# Patient Record
Sex: MALE | Race: WHITE | Employment: OTHER | URBAN - METROPOLITAN AREA
[De-identification: names, ages, dates, MRNs, and addresses within clinical notes are randomized per-mention and may not be internally consistent; named-entity substitution may affect disease eponyms.]

---

## 2017-01-01 ENCOUNTER — HOSPITAL ENCOUNTER (OUTPATIENT)
Dept: CARDIAC REHAB | Age: 67
Discharge: HOME | End: 2017-11-13
Payer: MEDICARE

## 2017-01-01 ENCOUNTER — APPOINTMENT (OUTPATIENT)
Dept: CARDIAC REHAB | Age: 67
End: 2017-01-01

## 2017-01-01 ENCOUNTER — HOSPITAL ENCOUNTER (OUTPATIENT)
Dept: CARDIAC CATH/INVASIVE PROCEDURES | Age: 67
Setting detail: OBSERVATION
Discharge: HOME | End: 2017-10-19
Attending: INTERNAL MEDICINE | Admitting: INTERNAL MEDICINE
Payer: MEDICARE

## 2017-01-01 ENCOUNTER — HOSPITAL ENCOUNTER (OUTPATIENT)
Dept: LAB | Age: 67
Discharge: HOME | End: 2017-10-13
Attending: INTERNAL MEDICINE
Payer: MEDICARE

## 2017-01-01 ENCOUNTER — HOSPITAL ENCOUNTER (OUTPATIENT)
Dept: CARDIAC REHAB | Age: 67
Discharge: HOME | End: 2017-10-31
Payer: MEDICARE

## 2017-01-01 ENCOUNTER — APPOINTMENT (OUTPATIENT)
Dept: CARDIAC REHAB | Age: 67
End: 2017-01-01
Payer: MEDICARE

## 2017-01-01 ENCOUNTER — HOSPITAL ENCOUNTER (OUTPATIENT)
Dept: CARDIAC REHAB | Age: 67
End: 2017-01-01
Payer: MEDICARE

## 2017-01-01 ENCOUNTER — HOSPITAL ENCOUNTER (OUTPATIENT)
Dept: CARDIAC REHAB | Age: 67
Discharge: HOME | End: 2017-10-26

## 2017-01-01 VITALS
OXYGEN SATURATION: 93 % | BODY MASS INDEX: 37.52 KG/M2 | HEIGHT: 75 IN | TEMPERATURE: 97.6 F | WEIGHT: 301.8 LBS | HEART RATE: 73 BPM | SYSTOLIC BLOOD PRESSURE: 114 MMHG | DIASTOLIC BLOOD PRESSURE: 65 MMHG | RESPIRATION RATE: 18 BRPM

## 2017-01-01 VITALS — BODY MASS INDEX: 39.17 KG/M2 | WEIGHT: 315 LBS | HEIGHT: 75 IN

## 2017-01-01 DIAGNOSIS — I25.118 CORONARY ARTERY DISEASE OF NATIVE ARTERY OF NATIVE HEART WITH STABLE ANGINA PECTORIS (HCC): Chronic | ICD-10-CM

## 2017-01-01 LAB
ANION GAP SERPL CALC-SCNC: 7 MMOL/L
ANION GAP SERPL CALC-SCNC: 9 MMOL/L (ref 7–16)
ATRIAL RATE: 73 BPM
ATRIAL RATE: 75 BPM
BASOPHILS # BLD: 0 K/UL (ref 0–0.2)
BASOPHILS # BLD: 0 K/UL (ref 0–0.2)
BASOPHILS NFR BLD: 0 % (ref 0–2)
BASOPHILS NFR BLD: 0 % (ref 0–2)
BNP SERPL-MCNC: 52 PG/ML
BUN SERPL-MCNC: 19 MG/DL (ref 8–23)
BUN SERPL-MCNC: 20 MG/DL (ref 8–23)
CALCIUM SERPL-MCNC: 8.8 MG/DL (ref 8.3–10.4)
CALCIUM SERPL-MCNC: 8.9 MG/DL (ref 8.3–10.4)
CALCULATED P AXIS, ECG09: 28 DEGREES
CALCULATED P AXIS, ECG09: 61 DEGREES
CALCULATED R AXIS, ECG10: 49 DEGREES
CALCULATED R AXIS, ECG10: 70 DEGREES
CALCULATED T AXIS, ECG11: 26 DEGREES
CALCULATED T AXIS, ECG11: 38 DEGREES
CHLORIDE SERPL-SCNC: 101 MMOL/L (ref 98–107)
CHLORIDE SERPL-SCNC: 106 MMOL/L (ref 98–107)
CO2 SERPL-SCNC: 26 MMOL/L (ref 21–32)
CO2 SERPL-SCNC: 31 MMOL/L (ref 21–32)
CREAT SERPL-MCNC: 1.03 MG/DL (ref 0.8–1.5)
CREAT SERPL-MCNC: 1.2 MG/DL (ref 0.8–1.5)
DIAGNOSIS, 93000: NORMAL
DIAGNOSIS, 93000: NORMAL
DIFFERENTIAL METHOD BLD: ABNORMAL
DIFFERENTIAL METHOD BLD: NORMAL
EOSINOPHIL # BLD: 0.1 K/UL (ref 0–0.8)
EOSINOPHIL # BLD: 0.1 K/UL (ref 0–0.8)
EOSINOPHIL NFR BLD: 2 % (ref 0.5–7.8)
EOSINOPHIL NFR BLD: 2 % (ref 0.5–7.8)
ERYTHROCYTE [DISTWIDTH] IN BLOOD BY AUTOMATED COUNT: 13 % (ref 11.9–14.6)
ERYTHROCYTE [DISTWIDTH] IN BLOOD BY AUTOMATED COUNT: 13.3 % (ref 11.9–14.6)
GLUCOSE BLD STRIP.AUTO-MCNC: 194 MG/DL (ref 65–100)
GLUCOSE BLD STRIP.AUTO-MCNC: 207 MG/DL (ref 65–100)
GLUCOSE BLD STRIP.AUTO-MCNC: 235 MG/DL (ref 65–100)
GLUCOSE BLD STRIP.AUTO-MCNC: 298 MG/DL (ref 65–100)
GLUCOSE BLD STRIP.AUTO-MCNC: 347 MG/DL (ref 65–100)
GLUCOSE SERPL-MCNC: 193 MG/DL (ref 65–100)
GLUCOSE SERPL-MCNC: 273 MG/DL (ref 65–100)
HCT VFR BLD AUTO: 40.1 % (ref 41.1–50.3)
HCT VFR BLD AUTO: 41.5 % (ref 41.1–50.3)
HGB BLD-MCNC: 13.4 G/DL (ref 13.6–17.2)
HGB BLD-MCNC: 13.6 G/DL (ref 13.6–17.2)
IMM GRANULOCYTES # BLD: 0 K/UL (ref 0–0.5)
IMM GRANULOCYTES NFR BLD: 0.3 % (ref 0–5)
LYMPHOCYTES # BLD: 1.6 K/UL (ref 0.5–4.6)
LYMPHOCYTES # BLD: 1.7 K/UL (ref 0.5–4.6)
LYMPHOCYTES NFR BLD: 22 % (ref 13–44)
LYMPHOCYTES NFR BLD: 24 % (ref 13–44)
MCH RBC QN AUTO: 31.3 PG (ref 26.1–32.9)
MCH RBC QN AUTO: 31.9 PG (ref 26.1–32.9)
MCHC RBC AUTO-ENTMCNC: 32.8 G/DL (ref 31.4–35)
MCHC RBC AUTO-ENTMCNC: 33.4 G/DL (ref 31.4–35)
MCV RBC AUTO: 93.7 FL (ref 79.6–97.8)
MCV RBC AUTO: 97.2 FL (ref 79.6–97.8)
MONOCYTES # BLD: 0.4 K/UL (ref 0.1–1.3)
MONOCYTES # BLD: 0.4 K/UL (ref 0.1–1.3)
MONOCYTES NFR BLD: 5 % (ref 4–12)
MONOCYTES NFR BLD: 6 % (ref 4–12)
NEUTS SEG # BLD: 4.4 K/UL (ref 1.7–8.2)
NEUTS SEG # BLD: 5.5 K/UL (ref 1.7–8.2)
NEUTS SEG NFR BLD: 68 % (ref 43–78)
NEUTS SEG NFR BLD: 71 % (ref 43–78)
P-R INTERVAL, ECG05: 280 MS
P-R INTERVAL, ECG05: 302 MS
PLATELET # BLD AUTO: 240 K/UL (ref 150–450)
PLATELET # BLD AUTO: 252 K/UL (ref 150–450)
PMV BLD AUTO: 10.8 FL (ref 10.8–14.1)
PMV BLD AUTO: 10.9 FL (ref 10.8–14.1)
POTASSIUM SERPL-SCNC: 3.6 MMOL/L (ref 3.5–5.1)
POTASSIUM SERPL-SCNC: 3.9 MMOL/L (ref 3.5–5.1)
Q-T INTERVAL, ECG07: 412 MS
Q-T INTERVAL, ECG07: 418 MS
QRS DURATION, ECG06: 88 MS
QRS DURATION, ECG06: 94 MS
QTC CALCULATION (BEZET), ECG08: 453 MS
QTC CALCULATION (BEZET), ECG08: 466 MS
RBC # BLD AUTO: 4.27 M/UL (ref 4.23–5.67)
RBC # BLD AUTO: 4.28 M/UL (ref 4.23–5.67)
SODIUM SERPL-SCNC: 139 MMOL/L (ref 136–145)
SODIUM SERPL-SCNC: 141 MMOL/L (ref 136–145)
VENTRICULAR RATE, ECG03: 73 BPM
VENTRICULAR RATE, ECG03: 75 BPM
WBC # BLD AUTO: 6.6 K/UL (ref 4.3–11.1)
WBC # BLD AUTO: 7.7 K/UL (ref 4.3–11.1)

## 2017-01-01 PROCEDURE — 99218 HC RM OBSERVATION: CPT

## 2017-01-01 PROCEDURE — 80048 BASIC METABOLIC PNL TOTAL CA: CPT | Performed by: INTERNAL MEDICINE

## 2017-01-01 PROCEDURE — C1725 CATH, TRANSLUMIN NON-LASER: HCPCS

## 2017-01-01 PROCEDURE — 93798 PHYS/QHP OP CAR RHAB W/ECG: CPT

## 2017-01-01 PROCEDURE — 77030004534 HC CATH ANGI DX INFN CARD -A

## 2017-01-01 PROCEDURE — 77030015766

## 2017-01-01 PROCEDURE — C8929 TTE W OR WO FOL WCON,DOPPLER: HCPCS

## 2017-01-01 PROCEDURE — 74011250636 HC RX REV CODE- 250/636: Performed by: INTERNAL MEDICINE

## 2017-01-01 PROCEDURE — 92928 PRQ TCAT PLMT NTRAC ST 1 LES: CPT

## 2017-01-01 PROCEDURE — 36415 COLL VENOUS BLD VENIPUNCTURE: CPT | Performed by: INTERNAL MEDICINE

## 2017-01-01 PROCEDURE — 74011636637 HC RX REV CODE- 636/637: Performed by: INTERNAL MEDICINE

## 2017-01-01 PROCEDURE — 85025 COMPLETE CBC W/AUTO DIFF WBC: CPT | Performed by: INTERNAL MEDICINE

## 2017-01-01 PROCEDURE — C1769 GUIDE WIRE: HCPCS

## 2017-01-01 PROCEDURE — 74011250637 HC RX REV CODE- 250/637: Performed by: INTERNAL MEDICINE

## 2017-01-01 PROCEDURE — C1874 STENT, COATED/COV W/DEL SYS: HCPCS

## 2017-01-01 PROCEDURE — 83880 ASSAY OF NATRIURETIC PEPTIDE: CPT | Performed by: INTERNAL MEDICINE

## 2017-01-01 PROCEDURE — C1894 INTRO/SHEATH, NON-LASER: HCPCS

## 2017-01-01 PROCEDURE — 74011250637 HC RX REV CODE- 250/637: Performed by: PHYSICIAN ASSISTANT

## 2017-01-01 PROCEDURE — 74011636320 HC RX REV CODE- 636/320: Performed by: INTERNAL MEDICINE

## 2017-01-01 PROCEDURE — 93005 ELECTROCARDIOGRAM TRACING: CPT | Performed by: INTERNAL MEDICINE

## 2017-01-01 PROCEDURE — C1887 CATHETER, GUIDING: HCPCS

## 2017-01-01 PROCEDURE — 99152 MOD SED SAME PHYS/QHP 5/>YRS: CPT

## 2017-01-01 PROCEDURE — 99153 MOD SED SAME PHYS/QHP EA: CPT

## 2017-01-01 PROCEDURE — 93458 L HRT ARTERY/VENTRICLE ANGIO: CPT

## 2017-01-01 PROCEDURE — 74011250636 HC RX REV CODE- 250/636

## 2017-01-01 PROCEDURE — 77030012468 HC VLV BLEEDBK CNTRL ABBT -B

## 2017-01-01 PROCEDURE — 74011000250 HC RX REV CODE- 250: Performed by: INTERNAL MEDICINE

## 2017-01-01 PROCEDURE — 77030029997 HC DEV COM RDL R BND TELE -B

## 2017-01-01 PROCEDURE — 82962 GLUCOSE BLOOD TEST: CPT

## 2017-01-01 PROCEDURE — 74011000258 HC RX REV CODE- 258: Performed by: INTERNAL MEDICINE

## 2017-01-01 RX ORDER — ATORVASTATIN CALCIUM 80 MG/1
80 TABLET, FILM COATED ORAL
Qty: 30 TAB | Refills: 11 | Status: SHIPPED | OUTPATIENT
Start: 2017-01-01 | End: 2017-01-01

## 2017-01-01 RX ORDER — INSULIN LISPRO 100 [IU]/ML
INJECTION, SOLUTION INTRAVENOUS; SUBCUTANEOUS
Status: DISCONTINUED | OUTPATIENT
Start: 2017-01-01 | End: 2017-01-01

## 2017-01-01 RX ORDER — BENAZEPRIL HYDROCHLORIDE 40 MG/1
40 TABLET ORAL DAILY
Qty: 30 TAB | Refills: 11 | Status: SHIPPED | OUTPATIENT
Start: 2017-01-01

## 2017-01-01 RX ORDER — SODIUM CHLORIDE 0.9 % (FLUSH) 0.9 %
5-10 SYRINGE (ML) INJECTION AS NEEDED
Status: DISCONTINUED | OUTPATIENT
Start: 2017-01-01 | End: 2017-01-01 | Stop reason: HOSPADM

## 2017-01-01 RX ORDER — GUAIFENESIN 100 MG/5ML
324 LIQUID (ML) ORAL ONCE
Status: ACTIVE | OUTPATIENT
Start: 2017-01-01 | End: 2017-01-01

## 2017-01-01 RX ORDER — HYDROMORPHONE HYDROCHLORIDE 1 MG/ML
.5-1 INJECTION, SOLUTION INTRAMUSCULAR; INTRAVENOUS; SUBCUTANEOUS AS NEEDED
Status: DISCONTINUED | OUTPATIENT
Start: 2017-01-01 | End: 2017-01-01

## 2017-01-01 RX ORDER — ATORVASTATIN CALCIUM 80 MG/1
80 TABLET, FILM COATED ORAL
Qty: 30 TAB | Refills: 11 | Status: SHIPPED | OUTPATIENT
Start: 2017-01-01

## 2017-01-01 RX ORDER — FAMOTIDINE 10 MG/ML
20 INJECTION INTRAVENOUS AS NEEDED
Status: DISCONTINUED | OUTPATIENT
Start: 2017-01-01 | End: 2017-01-01 | Stop reason: HOSPADM

## 2017-01-01 RX ORDER — HYDROCODONE BITARTRATE AND ACETAMINOPHEN 10; 325 MG/1; MG/1
1 TABLET ORAL
Status: DISCONTINUED | OUTPATIENT
Start: 2017-01-01 | End: 2017-01-01 | Stop reason: HOSPADM

## 2017-01-01 RX ORDER — LORAZEPAM 1 MG/1
1 TABLET ORAL
Status: DISCONTINUED | OUTPATIENT
Start: 2017-01-01 | End: 2017-01-01 | Stop reason: HOSPADM

## 2017-01-01 RX ORDER — INSULIN GLARGINE 100 [IU]/ML
30 INJECTION, SOLUTION SUBCUTANEOUS
Status: DISCONTINUED | OUTPATIENT
Start: 2017-01-01 | End: 2017-01-01 | Stop reason: HOSPADM

## 2017-01-01 RX ORDER — CARVEDILOL 6.25 MG/1
6.25 TABLET ORAL 2 TIMES DAILY WITH MEALS
Status: DISCONTINUED | OUTPATIENT
Start: 2017-01-01 | End: 2017-01-01 | Stop reason: HOSPADM

## 2017-01-01 RX ORDER — HEPARIN SODIUM 200 [USP'U]/100ML
25 INJECTION, SOLUTION INTRAVENOUS CONTINUOUS
Status: DISCONTINUED | OUTPATIENT
Start: 2017-01-01 | End: 2017-01-01

## 2017-01-01 RX ORDER — ALBUTEROL SULFATE 90 UG/1
2 AEROSOL, METERED RESPIRATORY (INHALATION)
Status: DISCONTINUED | OUTPATIENT
Start: 2017-01-01 | End: 2017-01-01 | Stop reason: HOSPADM

## 2017-01-01 RX ORDER — CLOPIDOGREL BISULFATE 75 MG/1
600 TABLET ORAL ONCE
Status: COMPLETED | OUTPATIENT
Start: 2017-01-01 | End: 2017-01-01

## 2017-01-01 RX ORDER — PANTOPRAZOLE SODIUM 40 MG/1
40 TABLET, DELAYED RELEASE ORAL
Status: DISCONTINUED | OUTPATIENT
Start: 2017-01-01 | End: 2017-01-01 | Stop reason: HOSPADM

## 2017-01-01 RX ORDER — ATORVASTATIN CALCIUM 40 MG/1
80 TABLET, FILM COATED ORAL
Status: DISCONTINUED | OUTPATIENT
Start: 2017-01-01 | End: 2017-01-01 | Stop reason: HOSPADM

## 2017-01-01 RX ORDER — LIDOCAINE HYDROCHLORIDE 20 MG/ML
1-20 INJECTION, SOLUTION INFILTRATION; PERINEURAL ONCE
Status: COMPLETED | OUTPATIENT
Start: 2017-01-01 | End: 2017-01-01

## 2017-01-01 RX ORDER — SODIUM CHLORIDE 9 MG/ML
75 INJECTION, SOLUTION INTRAVENOUS CONTINUOUS
Status: DISCONTINUED | OUTPATIENT
Start: 2017-01-01 | End: 2017-01-01 | Stop reason: HOSPADM

## 2017-01-01 RX ORDER — CLOPIDOGREL BISULFATE 75 MG/1
75 TABLET ORAL DAILY
Status: DISCONTINUED | OUTPATIENT
Start: 2017-01-01 | End: 2017-01-01 | Stop reason: HOSPADM

## 2017-01-01 RX ORDER — DIPHENHYDRAMINE HYDROCHLORIDE 50 MG/ML
50 INJECTION, SOLUTION INTRAMUSCULAR; INTRAVENOUS ONCE
Status: COMPLETED | OUTPATIENT
Start: 2017-01-01 | End: 2017-01-01

## 2017-01-01 RX ORDER — HYDROCHLOROTHIAZIDE 25 MG/1
25 TABLET ORAL DAILY
Qty: 30 TAB | Refills: 11 | Status: SHIPPED | OUTPATIENT
Start: 2017-01-01 | End: 2017-01-01

## 2017-01-01 RX ORDER — HYDROCORTISONE SODIUM SUCCINATE 100 MG/2ML
100 INJECTION, POWDER, FOR SOLUTION INTRAMUSCULAR; INTRAVENOUS ONCE
Status: COMPLETED | OUTPATIENT
Start: 2017-01-01 | End: 2017-01-01

## 2017-01-01 RX ORDER — NITROGLYCERIN 0.4 MG/1
0.4 TABLET SUBLINGUAL
Qty: 1 BOTTLE | Refills: 11 | Status: SHIPPED | OUTPATIENT
Start: 2017-01-01 | End: 2017-01-01

## 2017-01-01 RX ORDER — DIAZEPAM 5 MG/1
5 TABLET ORAL ONCE
Status: ACTIVE | OUTPATIENT
Start: 2017-01-01 | End: 2017-01-01

## 2017-01-01 RX ORDER — CLOPIDOGREL BISULFATE 75 MG/1
75 TABLET ORAL DAILY
Qty: 30 TAB | Refills: 11 | Status: SHIPPED | OUTPATIENT
Start: 2017-01-01 | End: 2017-01-01

## 2017-01-01 RX ORDER — NITROGLYCERIN 0.4 MG/1
0.4 TABLET SUBLINGUAL
Qty: 1 BOTTLE | Refills: 11 | Status: SHIPPED | OUTPATIENT
Start: 2017-01-01

## 2017-01-01 RX ORDER — HYDROCHLOROTHIAZIDE 25 MG/1
25 TABLET ORAL DAILY
Qty: 30 TAB | Refills: 11 | Status: SHIPPED | OUTPATIENT
Start: 2017-01-01

## 2017-01-01 RX ORDER — INSULIN LISPRO 100 [IU]/ML
INJECTION, SOLUTION INTRAVENOUS; SUBCUTANEOUS EVERY 4 HOURS
Status: DISCONTINUED | OUTPATIENT
Start: 2017-01-01 | End: 2017-01-01

## 2017-01-01 RX ORDER — SODIUM CHLORIDE 0.9 % (FLUSH) 0.9 %
5-10 SYRINGE (ML) INJECTION EVERY 8 HOURS
Status: DISCONTINUED | OUTPATIENT
Start: 2017-01-01 | End: 2017-01-01 | Stop reason: HOSPADM

## 2017-01-01 RX ORDER — BENAZEPRIL HYDROCHLORIDE 40 MG/1
40 TABLET ORAL DAILY
Qty: 30 TAB | Refills: 11 | Status: SHIPPED | OUTPATIENT
Start: 2017-01-01 | End: 2017-01-01

## 2017-01-01 RX ORDER — MAG HYDROX/ALUMINUM HYD/SIMETH 200-200-20
30 SUSPENSION, ORAL (FINAL DOSE FORM) ORAL ONCE
Status: COMPLETED | OUTPATIENT
Start: 2017-01-01 | End: 2017-01-01

## 2017-01-01 RX ORDER — ACETAMINOPHEN 325 MG/1
650 TABLET ORAL
Status: DISCONTINUED | OUTPATIENT
Start: 2017-01-01 | End: 2017-01-01 | Stop reason: HOSPADM

## 2017-01-01 RX ORDER — FENTANYL CITRATE 50 UG/ML
25-100 INJECTION, SOLUTION INTRAMUSCULAR; INTRAVENOUS
Status: DISCONTINUED | OUTPATIENT
Start: 2017-01-01 | End: 2017-01-01

## 2017-01-01 RX ORDER — CLOPIDOGREL BISULFATE 75 MG/1
75 TABLET ORAL DAILY
Qty: 30 TAB | Refills: 11 | Status: SHIPPED | OUTPATIENT
Start: 2017-01-01

## 2017-01-01 RX ORDER — POTASSIUM CHLORIDE 20 MEQ/1
40 TABLET, EXTENDED RELEASE ORAL
Status: COMPLETED | OUTPATIENT
Start: 2017-01-01 | End: 2017-01-01

## 2017-01-01 RX ORDER — MORPHINE SULFATE 2 MG/ML
2 INJECTION, SOLUTION INTRAMUSCULAR; INTRAVENOUS
Status: DISCONTINUED | OUTPATIENT
Start: 2017-01-01 | End: 2017-01-01 | Stop reason: HOSPADM

## 2017-01-01 RX ORDER — ASPIRIN 81 MG/1
81 TABLET ORAL DAILY
Status: DISCONTINUED | OUTPATIENT
Start: 2017-01-01 | End: 2017-01-01 | Stop reason: HOSPADM

## 2017-01-01 RX ORDER — INSULIN LISPRO 100 [IU]/ML
INJECTION, SOLUTION INTRAVENOUS; SUBCUTANEOUS
Status: DISCONTINUED | OUTPATIENT
Start: 2017-01-01 | End: 2017-01-01 | Stop reason: HOSPADM

## 2017-01-01 RX ORDER — MIDAZOLAM HYDROCHLORIDE 1 MG/ML
1-6 INJECTION, SOLUTION INTRAMUSCULAR; INTRAVENOUS
Status: DISCONTINUED | OUTPATIENT
Start: 2017-01-01 | End: 2017-01-01

## 2017-01-01 RX ORDER — OXYCODONE HYDROCHLORIDE 5 MG/1
20 TABLET ORAL
Status: DISCONTINUED | OUTPATIENT
Start: 2017-01-01 | End: 2017-01-01 | Stop reason: HOSPADM

## 2017-01-01 RX ORDER — NITROGLYCERIN 0.4 MG/1
0.4 TABLET SUBLINGUAL
Status: DISCONTINUED | OUTPATIENT
Start: 2017-01-01 | End: 2017-01-01 | Stop reason: HOSPADM

## 2017-01-01 RX ADMIN — HYDROMORPHONE HYDROCHLORIDE 1 MG: 1 INJECTION, SOLUTION INTRAMUSCULAR; INTRAVENOUS; SUBCUTANEOUS at 14:46

## 2017-01-01 RX ADMIN — MIDAZOLAM HYDROCHLORIDE 1 MG: 1 INJECTION, SOLUTION INTRAMUSCULAR; INTRAVENOUS at 15:10

## 2017-01-01 RX ADMIN — INSULIN LISPRO 8 UNITS: 100 INJECTION, SOLUTION INTRAVENOUS; SUBCUTANEOUS at 22:18

## 2017-01-01 RX ADMIN — INSULIN GLARGINE 30 UNITS: 100 INJECTION, SOLUTION SUBCUTANEOUS at 22:17

## 2017-01-01 RX ADMIN — FENTANYL CITRATE 25 MCG: 50 INJECTION, SOLUTION INTRAMUSCULAR; INTRAVENOUS at 15:24

## 2017-01-01 RX ADMIN — INSULIN LISPRO 4 UNITS: 100 INJECTION, SOLUTION INTRAVENOUS; SUBCUTANEOUS at 18:29

## 2017-01-01 RX ADMIN — OXYCODONE HYDROCHLORIDE 20 MG: 5 TABLET ORAL at 18:24

## 2017-01-01 RX ADMIN — FENTANYL CITRATE 50 MCG: 50 INJECTION, SOLUTION INTRAMUSCULAR; INTRAVENOUS at 14:53

## 2017-01-01 RX ADMIN — INSULIN LISPRO 4 UNITS: 100 INJECTION, SOLUTION INTRAVENOUS; SUBCUTANEOUS at 08:15

## 2017-01-01 RX ADMIN — SODIUM CHLORIDE 75 ML/HR: 900 INJECTION, SOLUTION INTRAVENOUS at 10:46

## 2017-01-01 RX ADMIN — CLOPIDOGREL BISULFATE 75 MG: 75 TABLET ORAL at 08:16

## 2017-01-01 RX ADMIN — HYDROCORTISONE SODIUM SUCCINATE 100 MG: 100 INJECTION, POWDER, FOR SOLUTION INTRAMUSCULAR; INTRAVENOUS at 11:00

## 2017-01-01 RX ADMIN — FENTANYL CITRATE 25 MCG: 50 INJECTION, SOLUTION INTRAMUSCULAR; INTRAVENOUS at 15:10

## 2017-01-01 RX ADMIN — CLOPIDOGREL BISULFATE 600 MG: 75 TABLET ORAL at 15:47

## 2017-01-01 RX ADMIN — OXYCODONE HYDROCHLORIDE 20 MG: 5 TABLET ORAL at 04:53

## 2017-01-01 RX ADMIN — POTASSIUM CHLORIDE 40 MEQ: 20 TABLET, EXTENDED RELEASE ORAL at 08:16

## 2017-01-01 RX ADMIN — ASPIRIN 81 MG: 81 TABLET, COATED ORAL at 08:16

## 2017-01-01 RX ADMIN — ALUMINUM HYDROXIDE, MAGNESIUM HYDROXIDE, AND SIMETHICONE 30 ML: 200; 200; 20 SUSPENSION ORAL at 15:47

## 2017-01-01 RX ADMIN — HYDROCHLOROTHIAZIDE: 25 TABLET ORAL at 08:16

## 2017-01-01 RX ADMIN — MIDAZOLAM HYDROCHLORIDE 2 MG: 1 INJECTION, SOLUTION INTRAMUSCULAR; INTRAVENOUS at 14:54

## 2017-01-01 RX ADMIN — LIDOCAINE HYDROCHLORIDE 60 MG: 20 INJECTION, SOLUTION INFILTRATION; PERINEURAL at 14:56

## 2017-01-01 RX ADMIN — Medication 10 ML: at 22:18

## 2017-01-01 RX ADMIN — ATORVASTATIN CALCIUM 80 MG: 40 TABLET, FILM COATED ORAL at 22:17

## 2017-01-01 RX ADMIN — CARVEDILOL 6.25 MG: 6.25 TABLET, FILM COATED ORAL at 08:16

## 2017-01-01 RX ADMIN — OXYCODONE HYDROCHLORIDE 20 MG: 5 TABLET ORAL at 23:30

## 2017-01-01 RX ADMIN — BIVALIRUDIN 1.75 MG/KG/HR: 250 INJECTION, POWDER, LYOPHILIZED, FOR SOLUTION INTRAVENOUS at 15:06

## 2017-01-01 RX ADMIN — CARVEDILOL 6.25 MG: 6.25 TABLET, FILM COATED ORAL at 18:24

## 2017-01-01 RX ADMIN — PANTOPRAZOLE SODIUM 40 MG: 40 TABLET, DELAYED RELEASE ORAL at 08:16

## 2017-01-01 RX ADMIN — IOPAMIDOL 160 ML: 755 INJECTION, SOLUTION INTRAVENOUS at 15:46

## 2017-01-01 RX ADMIN — HEPARIN SODIUM 25 ML/HR: 200 INJECTION, SOLUTION INTRAVENOUS at 14:37

## 2017-01-01 RX ADMIN — MIDAZOLAM HYDROCHLORIDE 1 MG: 1 INJECTION, SOLUTION INTRAMUSCULAR; INTRAVENOUS at 15:24

## 2017-01-01 RX ADMIN — DIPHENHYDRAMINE HYDROCHLORIDE 50 MG: 50 INJECTION, SOLUTION INTRAMUSCULAR; INTRAVENOUS at 11:00

## 2017-01-01 RX ADMIN — BIVALIRUDIN 1.75 MG/KG/HR: 250 INJECTION, POWDER, LYOPHILIZED, FOR SOLUTION INTRAVENOUS at 15:25

## 2017-01-01 RX ADMIN — PERFLUTREN 1 ML: 6.52 INJECTION, SUSPENSION INTRAVENOUS at 09:00

## 2017-01-01 RX ADMIN — HEPARIN SODIUM 2 ML: 10000 INJECTION, SOLUTION INTRAVENOUS; SUBCUTANEOUS at 15:01

## 2017-01-01 RX ADMIN — FAMOTIDINE 20 MG: 10 INJECTION, SOLUTION INTRAVENOUS at 14:23

## 2017-01-01 RX ADMIN — Medication 5 ML: at 17:00

## 2017-10-13 PROBLEM — Z79.4 CONTROLLED TYPE 2 DIABETES MELLITUS WITH DIABETIC POLYNEUROPATHY, WITH LONG-TERM CURRENT USE OF INSULIN (HCC): Chronic | Status: ACTIVE | Noted: 2017-01-01

## 2017-10-13 PROBLEM — E11.42 CONTROLLED TYPE 2 DIABETES MELLITUS WITH DIABETIC POLYNEUROPATHY, WITH LONG-TERM CURRENT USE OF INSULIN (HCC): Chronic | Status: ACTIVE | Noted: 2017-01-01

## 2017-10-13 PROBLEM — I10 ESSENTIAL HYPERTENSION: Chronic | Status: ACTIVE | Noted: 2017-01-01

## 2017-10-13 PROBLEM — E78.5 DYSLIPIDEMIA: Chronic | Status: ACTIVE | Noted: 2017-01-01

## 2017-10-13 PROBLEM — I25.118 CORONARY ARTERY DISEASE OF NATIVE ARTERY OF NATIVE HEART WITH STABLE ANGINA PECTORIS (HCC): Chronic | Status: ACTIVE | Noted: 2017-01-01

## 2017-10-17 NOTE — PROGRESS NOTES
Patient pre-assessment complete for [facility] poss with Dr Anita Khan scheduled for 10/18/17 at 12:30pm, arrival time 10:30am. Patient verified using . Patient instructed to bring all home medications in labeled bottles on the day of procedure. NPO status reinforced. Patient informed to take a full dose aspirin 325mg  or 81 mg x 4 on the day of procedure. Patient instructed to [address] starting tonight - take 1/2 lantus tonight & in am hold benazepril/hctz janumet & insulin. Instructed they can take all other medications excluding vitamins & supplements. Patient verbalizes understanding of all instructions & denies any questions at this time.

## 2017-10-18 PROBLEM — I25.10 CAD (CORONARY ARTERY DISEASE): Status: ACTIVE | Noted: 2017-01-01

## 2017-10-18 NOTE — RESEARCH NURSE
Pt. Awake alert and oriented x3. Patient approached for the [facility] [address] MultiPoint Pacing Study at Dr. Wendy Moyer'  request.  ICF presented and reivewed in detail and left with patient to read and consider.

## 2017-10-18 NOTE — PROGRESS NOTES
Patient received to [address] [place] [address] room # 2  Ambulatory from [facility]. Patient scheduled for LHC today with Dr Sheldon Flores. Procedure reviewed & questions answered, voiced good understanding consent obtained & placed on chart. All medications and medical history reviewed. Will prep patient per orders. Patient & family updated on plan of care.

## 2017-10-18 NOTE — PROCEDURES
Adalberto Ayoub 44       Name:  Lavonne Bentley   MR#:  [id number]   :  1950   Account #:  [de-identified]   Date of Adm:  10/18/2017       DATE OF PROCEDURE: 10/18/2017    PRIMARY CARDIOLOGIST: Sugey Saleh MD.    PRIMARY CARE PHYSICIAN: Pillo Link. MD Bailey.    BRIEF HISTORY: The patient is a 66-year-old gentleman with   history of atherosclerotic coronary disease, prior PCI and   stenting of the proximal left anterior descending. He presents   with class IV angina despite medical therapy, poorly controlled   diabetes and was referred for cardiac catheterization. PROCEDURE: After informed consent, he was prepped and draped in   the usual sterile fashion. Right wrist was infiltrated with   lidocaine. The right radial artery was accessed via   micropuncture technique, a 6-Hungarian sheath advanced without   complications. A 5-Hungarian Tiger catheter was utilized for left   and right coronary injections and a 5-Hungarian angled pigtail was   utilized for left ventricular pressure measurements. A 6-Hungarian   XB 3.0 guide was utilized for LAD intervention and a 6-Hungarian   JR5 guide was utilized for right coronary injection. Isovue   contrast utilized. CONSCIOUS SEDATION    START TIME: 14:47 p.m.     END TIME: 15:47 p.m. MEDICATIONS: 1 mg of Dilaudid, 4 mg of Versed and 100 mcg of   fentanyl. MONITORING RN: Liliana Tsai. FINDINGS:    1. Left ventricle: Left ventriculogram not obtained. Left   ventricular end-diastolic pressure is measured at 16 mmHg. There   is no aortic valve gradient. 2. Left main: Left main is large, bifurcates in the LAD and   circumflex systems, appears angiographically normal.   3. Left anterior descending coronary: This is a large vessel, it   is heavily calcified in the proximal portion. There is a 95%   stenosis at the takeoff of the first major septal at the   proximal edge of the previously placed CYPHER stent.  Beyond that, the LAD is quite large, gives rise to a large mid vessel   diagonal, courses around the apex disease free. 4. Left circumflex coronary: This is a moderate-sized vessel. It   has a focal 40% proximal stenosis, gives rise to a bifurcating   obtuse marginal which appears angiographically normal.   5. Right coronary: This is a large anatomically dominant vessel. It has a 30% to 40% mid stenosis and 95% distal stenosis at the   crux, diffuse 50% to 70% distal stenosis and another 80%   stenosis involving the distal bifurcation, primarily into the   posterolateral artery. The posterolateral artery is the larger   of the 2 vessels and beyond the focal proximal stenosis is   normal. The PDA is smaller in nature as a long area of 30% mid   stenosis. PERCUTANEOUS CORONARY INTERVENTION     LESION: Proximal LAD. Prestenosis 95%, poststenosis 0%. DETAILS: The patient anticoagulated with Angiomax. A 6-Romanian XB   3.0 guide was utilized. A [employer] wire was advanced distally. The lesion was predilated with a 3.0 x 20 NC [place] balloon, then   stented with a 3.5 x 32 Synergy drug-eluting stent, postdilated   to high pressures with a 3.5 x 20 NC [place] balloon. This yielded   an excellent angiographic result. The wire was removed and   orthogonal views were obtained. LESION #2: Distal right coronary appear. Prestenosis 95%, poststenosis 0%. DETAILS: The patient anticoagulated with Angiomax. A 6-Romanian   JR5 guide was utilized. Yesika Haylee was advanced into the   posterolateral branch. A 3.0 x 38 Synergy drug-eluting stent was   positioned from the proximal posterolateral artery across the   posterior descending extending into the distal right coronary   artery. This was inflated to 14 atmospheres. Subsequently, a 3.5   x 20 Synergy drug-eluting stent was positioned in sequence   proximally inflated up to 14 atmospheres.  A 3.0 x 20 NC [place]   balloon was utilized for distal stent and post-dilatation and a   3.5 x 20 NC [place] was utilized for proximal stent dilatation. The   wires removed and orthogonal views were obtained. There was no   compromise of the jailed PDA. Successful hemostasis with pneumatic radial band. Clopidogrel 600 mg were administered in the lab. CONCLUSION:   1. Patient with historically normal left ventricular systolic   function. We will check echocardiogram prior to discharge. Left   ventricular end-diastolic pressure is mildly elevated. 2. Complex proximal left anterior descending disease status post   percutaneous coronary intervention and stenting with Synergy   drug-eluting stent. 3. Moderate proximal left circumflex stenosis. 4. Moderate mid right coronary artery stenosis. 5. Complex long area of distal right coronary artery stenosis,   status post Synergy drug-eluting stent x2 crossing the right   posterior descending which remains widely patent and   uncompromised despite being in skilled nursing. Thank you for allowing us to participate in the care of this   patient. If questions or concerns, please feel free to contact   me.         MD JIM Jones / Steffen Moy   D:  10/18/2017   16:03   T:  10/18/2017   16:36   Job #:  [id number]

## 2017-10-18 NOTE — PROCEDURES
Brief Cardiac Procedure Note    Patient: Ron Recinos MRN: [id number]  SSN: [id number]    YOB: 1950  Age: 79 y.o. Sex: male      Date of Procedure: 10/18/2017     Pre-procedure Diagnosis: Chest pain CCS Class III    Post-procedure Diagnosis: Coronary Artery Disease    Procedure: Left Heart Catheterization with Percutaneous Coronary Intervention    Brief Description of Procedure: See note    Performed By: Jocelyn Yu MD     Assistants: None    Anesthesia: Moderate Sedation    Estimated Blood Loss: Less than 10 mL      Specimens: None    Implants: None    Findings:   LV:  EDP 18mmHg  LM:  NML  LAD:  95% prox  LCx:  30% prox  RCA:  30-40% mid, 95% distal    PCI pLAD 95-0%   3.0x20 NC [place]   3.5x32 Synergy   3.5x20 NC [place]    PCI dRCA 95-0%   3.0x38 Synergy   3.5x20 Synergy   3.0x20 NC [place]   3.5x20 NC [place]    600mg Plavix      Complications: None    Recommendations: Continue medical therapy.     Signed By: Jocelyn Yu MD     October 18, 2017

## 2017-10-18 NOTE — PROGRESS NOTES
Report received from David Castaneda Cath Lab RN. Procedural findings communicated. Intra procedural  medication administration reviewed. Progression of care discussed.      Patient received into [facility] [place] 1 post sheath removal.     Right Radial access site without bleeding or swelling     TR band dry and intact     Patient instructed to limit movement to right upper extremity    Routine post procedural vital signs and site assessment initiated

## 2017-10-18 NOTE — PROGRESS NOTES
Pt. Awake alert and oriented x3. Family at bedside. Patient was approached for the [facility] MultiPoint Pacing Study at Dr. Kylee Pearson request.  ICF was presented and reivewed in detail and left with patient to read and consider. After thorough review of the ICF and discussion with myself and Dr. Evette Soriano, the patient was able to verbalize understanding of the study purpose, design, risks/benefits, alternatives and costs. Patient understands that study participation is voluntary and he can withdraw from the study at any time. Patient expressed his desire to participate in the [facility] MPP study. Patient denies any questions or concerns at this time. ICF signatures were obtained prior to initiation of study procedures and a copy of the signed ICF provided to the patient and to the medical record.

## 2017-10-18 NOTE — IP AVS SNAPSHOT
[address] [address] 
[phone] Patient: Junior Mandel MRN: [id number] Current Discharge Medication List  
  
START taking these medications Dose & Instructions Dispensing Information Comments Morning Noon Evening Bedtime  
 benazepril 40 mg tablet Commonly known as:  LOTENSIN Your next dose is:  10-20 Dose:  40 mg Take 1 Tab by mouth daily. Quantity:  30 Tab Refills:  11  
     
   
   
   
  
 clopidogrel 75 mg Tab Commonly known as:  PLAVIX Your next dose is:  10-20 Dose:  75 mg Take 1 Tab by mouth daily. Quantity:  30 Tab Refills:  11  
     
   
   
   
  
 hydroCHLOROthiazide 25 mg tablet Commonly known as:  HYDRODIURIL Your next dose is:  10-20 Dose:  25 mg Take 1 Tab by mouth daily. Quantity:  30 Tab Refills:  11  
     
   
   
   
  
 nitroglycerin 0.4 mg SL tablet Commonly known as:  NITROSTAT Your next dose is:  As needed Dose:  0.4 mg  
1 Tab by SubLINGual route every five (5) minutes as needed for Chest Pain. Quantity:  1 Bottle Refills:  11 CONTINUE these medications which have CHANGED Dose & Instructions Dispensing Information Comments Morning Noon Evening Bedtime  
 atorvastatin 80 mg tablet Commonly known as:  LIPITOR What changed:   
- medication strength 
- how much to take - when to take this 
- reasons to take this Your next dose is:  10-19 Dose:  80 mg Take 1 Tab by mouth nightly. Quantity:  30 Tab Refills:  11 CONTINUE these medications which have NOT CHANGED Dose & Instructions Dispensing Information Comments Morning Noon Evening Bedtime  
 aspirin delayed-release 81 mg tablet Your next dose is:  10-20 Take  by mouth daily. Refills:  0  
     
   
   
   
  
 carvedilol 6.25 mg tablet Commonly known as:  Caitlyn Romero Your next dose is:  10-19 Take  by mouth two (2) times daily (with meals). Refills:  0 HumaLOG 100 unit/mL injection Generic drug:  insulin lispro  
   
 by SubCUTAneous route. Sliding scale with meals Refills:  0 JANUMET 50-1,000 mg per tablet Generic drug:  SITagliptin-metFORMIN Your next dose is:  Start back Saturday 10-21 Dose:  1 Tab Take 1 Tab by mouth two (2) times daily (with meals). Refills:  0  
     
   
   
   
  
 LANTUS 100 unit/mL injection Generic drug:  insulin glargine Dose:  30 Units 30 Units by SubCUTAneous route nightly. Refills:  0  
     
   
   
   
  
 omeprazole 40 mg capsule Commonly known as:  PRILOSEC Your next dose is:  10-20 Dose:  40 mg Take 40 mg by mouth every other day. Refills:  0  
     
   
   
   
  
 oxyCODONE IR 20 mg immediate release tablet Commonly known as:  Dondra Lexi Your next dose is:  As needed Dose:  20 mg Take 20 mg by mouth every six (6) hours as needed. Refills:  0 PROAIR HFA 90 mcg/actuation inhaler Generic drug:  albuterol Your next dose is:  As needed Dose:  2 Puff Take 2 Puffs by inhalation every four (4) hours as needed. Refills:  0 STOP taking these medications   
 benazepril-hydroCHLOROthiazide 20-25 mg per tablet Commonly known as:  LOTENSIN HCT Where to Get Your Medications These medications were sent to [address] [place] [address]  [address] [address] Phone:  [phone]  
  atorvastatin 80 mg tablet  
 benazepril 40 mg tablet  
 clopidogrel 75 mg Tab  
 hydroCHLOROthiazide 25 mg tablet  
 nitroglycerin 0.4 mg SL tablet

## 2017-10-18 NOTE — IP AVS SNAPSHOT
[address] [address] 
[phone] Patient: Maria Alejandra Huang MRN: [id number] [facility]:1[birth year]25[birth year]99 You are allergic to the following Allergen Reactions Contrast Dye (Iodine) Anaphylaxis Recent Documentation Height Weight BMI Smoking Status 1.905 m 136.9 kg 37.72 kg/m2 Never Smoker Emergency Contacts Name Discharge Info Relation Home Work Mobile Peg Denson DISCHARGE CAREGIVER [3] Spouse [3] [phone] About your hospitalization You were admitted on:  October 18, 2017 You last received care in the:  [facility] 3 CLINICAL OBSERVATION You were discharged on:  October 19, 2017 Unit phone number:  [phone] Why you were hospitalized Your primary diagnosis was:  Coronary Artery Disease Of Native Artery Of Native Heart With Stable Angina Pectoris (Hcc) Your diagnoses also included:  Essential Hypertension, Dyslipidemia, Controlled Type 2 Diabetes Mellitus With Diabetic Polyneuropathy, With Long-Term Current Use Of Insulin (Hcc), Cad (Coronary Artery Disease) Providers Seen During Your Hospitalizations Provider Role Specialty Primary office phone Surendra England MD Attending Provider Cardiology [phone] Your Primary Care Physician (PCP) Primary Care Physician Office Phone Office Fax 1701 [address] [phone] Follow-up Information Follow up With Details Comments Contact Info Pee Alejandro MD  As needed [address] [facility] [address] 
[phone] Surendra England MD  Oct 30 at [address] [facility] office  [address]  [address] [facility] [address] 
[phone] Your Appointments Monday October 30, 2017  9:45 AM EDT HOSPITAL FOLLOW-UP with Surendra England MD  
[facility] ([address] [facility]) [address] [place]  
[address] Dese[address] Worrell [address]  
[phone] Current Discharge Medication List  
  
START taking these medications Dose & Instructions Dispensing Information Comments Morning Noon Evening Bedtime  
 benazepril 40 mg tablet Commonly known as:  LOTENSIN Your next dose is:  10-20 Dose:  40 mg Take 1 Tab by mouth daily. Quantity:  30 Tab Refills:  11  
     
   
   
   
  
 clopidogrel 75 mg Tab Commonly known as:  PLAVIX Your next dose is:  10-20 Dose:  75 mg Take 1 Tab by mouth daily. Quantity:  30 Tab Refills:  11  
     
   
   
   
  
 hydroCHLOROthiazide 25 mg tablet Commonly known as:  HYDRODIURIL Your next dose is:  10-20 Dose:  25 mg Take 1 Tab by mouth daily. Quantity:  30 Tab Refills:  11  
     
   
   
   
  
 nitroglycerin 0.4 mg SL tablet Commonly known as:  NITROSTAT Your next dose is:  As needed Dose:  0.4 mg  
1 Tab by SubLINGual route every five (5) minutes as needed for Chest Pain. Quantity:  1 Bottle Refills:  11 CONTINUE these medications which have CHANGED Dose & Instructions Dispensing Information Comments Morning Noon Evening Bedtime  
 atorvastatin 80 mg tablet Commonly known as:  LIPITOR What changed:   
- medication strength 
- how much to take - when to take this 
- reasons to take this Your next dose is:  10-19 Dose:  80 mg Take 1 Tab by mouth nightly. Quantity:  30 Tab Refills:  11 CONTINUE these medications which have NOT CHANGED Dose & Instructions Dispensing Information Comments Morning Noon Evening Bedtime  
 aspirin delayed-release 81 mg tablet Your next dose is:  10-20 Take  by mouth daily. Refills:  0  
     
   
   
   
  
 carvedilol 6.25 mg tablet Commonly known as:  Gillermreginald Martin Your next dose is:  10-19 Take  by mouth two (2) times daily (with meals). Refills:  0 HumaLOG 100 unit/mL injection Generic drug:  insulin lispro  
   
 by SubCUTAneous route. Sliding scale with meals Refills:  0 JANUMET 50-1,000 mg per tablet Generic drug:  SITagliptin-metFORMIN Your next dose is:  Start back Saturday 10-21 Dose:  1 Tab Take 1 Tab by mouth two (2) times daily (with meals). Refills:  0  
     
   
   
   
  
 LANTUS 100 unit/mL injection Generic drug:  insulin glargine Dose:  30 Units 30 Units by SubCUTAneous route nightly. Refills:  0  
     
   
   
   
  
 omeprazole 40 mg capsule Commonly known as:  PRILOSEC Your next dose is:  10-20 Dose:  40 mg Take 40 mg by mouth every other day. Refills:  0  
     
   
   
   
  
 oxyCODONE IR 20 mg immediate release tablet Commonly known as:  Chares Hannah Your next dose is:  As needed Dose:  20 mg Take 20 mg by mouth every six (6) hours as needed. Refills:  0 PROAIR HFA 90 mcg/actuation inhaler Generic drug:  albuterol Your next dose is:  As needed Dose:  2 Puff Take 2 Puffs by inhalation every four (4) hours as needed. Refills:  0 STOP taking these medications   
 benazepril-hydroCHLOROthiazide 20-25 mg per tablet Commonly known as:  LOTENSIN HCT Where to Get Your Medications These medications were sent to [address] [place] [phone] [address] Phone:  [phone]  
  atorvastatin 80 mg tablet  
 benazepril 40 mg tablet  
 clopidogrel 75 mg Tab  
 hydroCHLOROthiazide 25 mg tablet  
 nitroglycerin 0.4 mg SL tablet Discharge Instructions Resume janumet on Saturday Oct 21 Cardiac Catheterization/Angiography Discharge Instructions *Check the puncture site frequently for swelling or bleeding. If you see any bleeding, lie down and apply pressure over the area with a clean town or washcloth. Notify your doctor for any redness, swelling, drainage or oozing from the puncture site. Notify your doctor for any fever or chills. *If the leg or arm with the puncture becomes cold, numb or painful, call [facility] cardiology at  [phone]. *Activity should be limited for the next 48 hours. Climb stairs as little as possible and avoid any stooping, bending or strenuous activity for 48 hours. No heavy lifting (anything over 10 pounds) for three days. *Do not drive for 48 hours. *You may resume your usual diet. Drink more fluids than usual. 
 
*Have a responsible person drive you home and stay with you for at least 24 hours after your heart catheterization/angiography. *You may remove the bandage from your Right and Arm in 24 hours. You may shower in 24 hours. No tub baths, hot tubs or swimming for one week. Do not place any lotions, creams, powders, ointments over the puncture site for one week. You may place a clean band-aid over the puncture site each day for 5 days. Change this daily. Percutaneous Coronary Intervention: What to Expect at [facility] Your Recovery Percutaneous coronary intervention (PCI) is the name for procedures that are used to open a narrowed or blocked coronary artery. The two most common PCI procedures are coronary angioplasty and coronary stent placement. Your groin or arm may have a bruise and feel sore for a day or two after a percutaneous coronary intervention (PCI). You can do light activities around the house, but nothing strenuous for several days. This care sheet gives you a general idea about how long it will take for you to recover. But each person recovers at a different pace. Follow the steps below to get better as quickly as possible. How can you care for yourself at home? Activity · If the doctor gave you a sedative: ¨ For 24 hours, don't do anything that requires attention to detail. It takes time for the medicine's effects to completely wear off. ¨ For your safety, do not drive or operate any machinery that could be dangerous. Wait until the medicine wears off and you can think clearly and react easily. · Do not do strenuous exercise and do not lift, pull, or push anything heavy until your doctor says it is okay. This may be for a day or two. You can walk around the house and do light activity, such as cooking. · If the catheter was placed in your groin, try not to walk up stairs for the first couple of days. · If the catheter was placed in your arm near your wrist, do not bend your wrist deeply for the first couple of days. Be careful using your hand to get into and out of a chair or bed. · Carry your stent identification card with you at all times. · If your doctor recommends it, get more exercise. Walking is a good choice. Bit by bit, increase the amount you walk every day. Try for at least 30 minutes on most days of the week. Diet · Drink plenty of fluids to help your body flush out the dye. If you have kidney, heart, or liver disease and have to limit fluids, talk with your doctor before you increase the amount of fluids you drink. · Keep eating a heart-healthy diet that has lots of fruits, vegetables, and whole grains. If you have not been eating this way, talk to your doctor. You also may want to talk to a dietitian. This expert can help you to learn about healthy foods and plan meals. Medicines · Your doctor will tell you if and when you can restart your medicines. He or she will also give you instructions about taking any new medicines. · If you take blood thinners, such as warfarin (Coumadin), clopidogrel (Plavix), or aspirin, be sure to talk to your doctor. He or she will tell you if and when to start taking those medicines again.  Make sure that you understand exactly what your doctor wants you to do. · Your doctor will prescribe blood-thinning medicines. You will likely take aspirin plus another antiplatelet, such as clopidogrel (Plavix). It is very important that you take these medicines exactly as directed. These medicines help keep the coronary artery open and reduce your risk of a heart attack. · Call your doctor if you think you are having a problem with your medicine. Care of the catheter site · For 1 or 2 days, keep a bandage over the spot where the catheter was inserted. The bandage probably will fall off in this time. · Put ice or a cold pack on the area for 10 to 20 minutes at a time to help with soreness or swelling. Put a thin cloth between the ice and your skin. · You may shower 24 to 48 hours after the procedure, if your doctor okays it. Pat the incision dry. · Do not soak the catheter site until it is healed. Don't take a bath for 1 week, or until your doctor tells you it is okay. Follow-up care is a key part of your treatment and safety. Be sure to make and go to all appointments, and call your doctor if you are having problems. It's also a good idea to know your test results and keep a list of the medicines you take. When should you call for help? Call 911 anytime you think you may need emergency care. For example, call if: 
· You passed out (lost consciousness). · You have severe trouble breathing. · You have sudden chest pain and shortness of breath, or you cough up blood. · You have symptoms of a heart attack, such as: ¨ Chest pain or pressure. ¨ Sweating. ¨ Shortness of breath. ¨ Nausea or vomiting. ¨ Pain that spreads from the chest to the neck, jaw, or one or both shoulders or arms. ¨ Dizziness or lightheadedness. ¨ A fast or uneven pulse. After calling 911, chew 1 adult-strength aspirin. Wait for an ambulance. Do not try to drive yourself.  
· You have been diagnosed with angina, and you have angina symptoms that do not go away with rest or are not getting better within 5 minutes after you take one dose of nitroglycerin. Call your doctor now or seek immediate medical care if: 
· You are bleeding from the area where the catheter was put in your artery. · You have a fast-growing, painful lump at the catheter site. · You have signs of infection, such as: 
¨ Increased pain, swelling, warmth, or redness. ¨ Red streaks leading from the catheter site. ¨ Pus draining from the catheter site. ¨ A fever. · Your leg or arm looks blue or feels cold, numb, or tingly. Watch closely for changes in your health, and be sure to contact your doctor if you have any problems. Where can you learn more? Go to http://april-karla.info/. Enter I578 in the search box to learn more about \"Percutaneous Coronary Intervention: What to Expect at Home. \" Current as of: January 13, 2017 Content Version: 11.3 © 9943-6186 [employer]. Care instructions adapted under license by [employer] (which disclaims liability or warranty for this information). If you have questions about a medical condition or this instruction, always ask your healthcare professional. [address] [address] any warranty or liability for your use of this information. Discharge Orders Procedure Order Date Status Priority Quantity Spec Type Associated Dx REFERRAL TO [facility] 10/19/17 0746 Normal Routine 1 "[employer] Announcement We are excited to announce that we are making your provider's discharge notes available to you in "[employer]. You will see these notes when they are completed and signed by the physician that discharged you from your recent hospital stay. If you have any questions or concerns about any information you see in "[employer] please call the Health Information Department where you were seen or reach out to your Primary Care Provider for more information about your plan of care. Introducing [facility]! [facility] introduces vMobo patient portal. Now you can access parts of your medical record, email your doctor's office, and request medication refills online. 1. In your internet browser, go to [email or web address] 2. Click on the First Time User? Click Here link in the Sign In box. You will see the New Member Sign Up page. 3. Enter your vMobo Access Code exactly as it appears below. You will not need to use this code after youve completed the sign-up process. If you do not sign up before the expiration date, you must request a new code. · vMobo Access Code: [email or web address] Expires: 1/11/2018  3:05 PM 
 
4. Enter the last four digits of your Social Security Number (xxxx) and Date of Birth (mm/dd/yyyy) as indicated and click Submit. You will be taken to the next sign-up page. 5. Create a vMobo ID. This will be your vMobo login ID and cannot be changed, so think of one that is secure and easy to remember. 6. Create a vMobo password. You can change your password at any time. 7. Enter your Password Reset Question and Answer. This can be used at a later time if you forget your password. 8. Enter your e-mail address. You will receive e-mail notification when new information is available in [address] 9. Click Sign Up. You can now view and download portions of your medical record. 10. Click the Download Summary menu link to download a portable copy of your medical information. If you have questions, please visit the Frequently Asked Questions section of the vMobo website. Remember, vMobo is NOT to be used for urgent needs. For medical emergencies, dial 911. Now available from your iPhone and Android! General Information Please provide this summary of care documentation to your next provider. Patient Signature:  ____________________________________________________________ Date:  ____________________________________________________________  
  
[employer] Artist Provider Signature:  ____________________________________________________________ Date:  ____________________________________________________________

## 2017-10-18 NOTE — ROUTINE PROCESS
TRANSFER - OUT REPORT:    Verbal report given to RN (name) on Susan Brake  being transferred to [address] [place] [address] (unit) for routine progression of care       Report consisted of patients Situation, Background, Assessment and   Recommendations(SBAR). Information from the following report(s) Procedure Summary, MAR and Recent Results was reviewed with the receiving nurse. Lines:   Peripheral IV 10/18/17 Right Arm (Active)       Peripheral IV 10/18/17 Left Antecubital (Active)        Opportunity for questions and clarification was provided.       [facility]  1 stent to LAD  2 stents to RCA  R band to right radial w/ 10 mls at 1550  Angiomax off at 1555  Plavix 600 mg PO  Mylanta 30 mls PO  Dilaudid 1 mg IV  Fentanyl 100 mcg IV  Versed 4 mg IV

## 2017-10-18 NOTE — PROGRESS NOTES
TRANSFER - IN REPORT:    Verbal report received from Judi Diana RN on Marshal Slight being received from [address] [place] [address] for routine progression of care. Report consisted of patients Situation, Background, Assessment and Recommendations(SBAR). Information from the following report(s) SBAR, Kardex, [facility] and Recent Results was reviewed. Opportunity for questions and clarification was provided. Assessment completed upon patients arrival to unit and care assumed. Patient received to room 331. Patient connected to monitor and assessment completed. Plan of care reviewed. Patient oriented to room and call light. Patient aware to use call light to communicate any chest pain or needs. Admission skin assessment completed with second RN and reveals the following: Skin intact with no breakdown noted. Right radial site with TR band in place intact with no bleeding or hematoma. Scattered bruising noted on BLE's and BUE's.

## 2017-10-19 NOTE — PROGRESS NOTES
Cardiac Rehab:  Spoke with patient regarding referral to cardiac rehab. Patient meets admission criteria based on PCI (date1/18/17). Discussed lifestyle modifications to promote cardiac wellness. Patient indicated that he wants to participate in cardiac rehab program.  Orientation scheduled 10/26/17 @ 12PM.  Cardiologist is Dr Kathrine Knight.

## 2017-10-19 NOTE — DISCHARGE INSTRUCTIONS
Resume janumet on Saturday Oct 21                     Cardiac Catheterization/Angiography Discharge Instructions    *Check the puncture site frequently for swelling or bleeding. If you see any bleeding, lie down and apply pressure over the area with a clean town or washcloth. Notify your doctor for any redness, swelling, drainage or oozing from the puncture site. Notify your doctor for any fever or chills. *If the leg or arm with the puncture becomes cold, numb or painful, call [facility] cardiology at  [phone]. *Activity should be limited for the next 48 hours. Climb stairs as little as possible and avoid any stooping, bending or strenuous activity for 48 hours. No heavy lifting (anything over 10 pounds) for three days. *Do not drive for 48 hours. *You may resume your usual diet. Drink more fluids than usual.    *Have a responsible person drive you home and stay with you for at least 24 hours after your heart catheterization/angiography. *You may remove the bandage from your Right and Arm in 24 hours. You may shower in 24 hours. No tub baths, hot tubs or swimming for one week. Do not place any lotions, creams, powders, ointments over the puncture site for one week. You may place a clean band-aid over the puncture site each day for 5 days. Change this daily. Percutaneous Coronary Intervention: What to Expect at [facility]    Percutaneous coronary intervention (PCI) is the name for procedures that are used to open a narrowed or blocked coronary artery. The two most common PCI procedures are coronary angioplasty and coronary stent placement. Your groin or arm may have a bruise and feel sore for a day or two after a percutaneous coronary intervention (PCI). You can do light activities around the house, but nothing strenuous for several days. This care sheet gives you a general idea about how long it will take for you to recover. But each person recovers at a different pace.  Follow the steps below to get better as quickly as possible. How can you care for yourself at home? Activity  · If the doctor gave you a sedative:  ¨ For 24 hours, don't do anything that requires attention to detail. It takes time for the medicine's effects to completely wear off. ¨ For your safety, do not drive or operate any machinery that could be dangerous. Wait until the medicine wears off and you can think clearly and react easily. · Do not do strenuous exercise and do not lift, pull, or push anything heavy until your doctor says it is okay. This may be for a day or two. You can walk around the house and do light activity, such as cooking. · If the catheter was placed in your groin, try not to walk up stairs for the first couple of days. · If the catheter was placed in your arm near your wrist, do not bend your wrist deeply for the first couple of days. Be careful using your hand to get into and out of a chair or bed. · Carry your stent identification card with you at all times. · If your doctor recommends it, get more exercise. Walking is a good choice. Bit by bit, increase the amount you walk every day. Try for at least 30 minutes on most days of the week. Diet  · Drink plenty of fluids to help your body flush out the dye. If you have kidney, heart, or liver disease and have to limit fluids, talk with your doctor before you increase the amount of fluids you drink. · Keep eating a heart-healthy diet that has lots of fruits, vegetables, and whole grains. If you have not been eating this way, talk to your doctor. You also may want to talk to a dietitian. This expert can help you to learn about healthy foods and plan meals. Medicines  · Your doctor will tell you if and when you can restart your medicines. He or she will also give you instructions about taking any new medicines. · If you take blood thinners, such as warfarin (Coumadin), clopidogrel (Plavix), or aspirin, be sure to talk to your doctor.  He or she will tell you if and when to start taking those medicines again. Make sure that you understand exactly what your doctor wants you to do. · Your doctor will prescribe blood-thinning medicines. You will likely take aspirin plus another antiplatelet, such as clopidogrel (Plavix). It is very important that you take these medicines exactly as directed. These medicines help keep the coronary artery open and reduce your risk of a heart attack. · Call your doctor if you think you are having a problem with your medicine. Care of the catheter site  · For 1 or 2 days, keep a bandage over the spot where the catheter was inserted. The bandage probably will fall off in this time. · Put ice or a cold pack on the area for 10 to 20 minutes at a time to help with soreness or swelling. Put a thin cloth between the ice and your skin. · You may shower 24 to 48 hours after the procedure, if your doctor okays it. Pat the incision dry. · Do not soak the catheter site until it is healed. Don't take a bath for 1 week, or until your doctor tells you it is okay. Follow-up care is a key part of your treatment and safety. Be sure to make and go to all appointments, and call your doctor if you are having problems. It's also a good idea to know your test results and keep a list of the medicines you take. When should you call for help? Call 911 anytime you think you may need emergency care. For example, call if:  · You passed out (lost consciousness). · You have severe trouble breathing. · You have sudden chest pain and shortness of breath, or you cough up blood. · You have symptoms of a heart attack, such as:  ¨ Chest pain or pressure. ¨ Sweating. ¨ Shortness of breath. ¨ Nausea or vomiting. ¨ Pain that spreads from the chest to the neck, jaw, or one or both shoulders or arms. ¨ Dizziness or lightheadedness. ¨ A fast or uneven pulse. After calling 911, chew 1 adult-strength aspirin. Wait for an ambulance.  Do not try to drive yourself. · You have been diagnosed with angina, and you have angina symptoms that do not go away with rest or are not getting better within 5 minutes after you take one dose of nitroglycerin. Call your doctor now or seek immediate medical care if:  · You are bleeding from the area where the catheter was put in your artery. · You have a fast-growing, painful lump at the catheter site. · You have signs of infection, such as:  ¨ Increased pain, swelling, warmth, or redness. ¨ Red streaks leading from the catheter site. ¨ Pus draining from the catheter site. ¨ A fever. · Your leg or arm looks blue or feels cold, numb, or tingly. Watch closely for changes in your health, and be sure to contact your doctor if you have any problems. Where can you learn more? Go to http://april-karla.info/. Enter R259 in the search box to learn more about \"Percutaneous Coronary Intervention: What to Expect at Home. \"  Current as of: January 13, 2017  Content Version: 11.3  © 4933-2272 [employer]. Care instructions adapted under license by [employer] (which disclaims liability or warranty for this information). If you have questions about a medical condition or this instruction, always ask your healthcare professional. [address] any warranty or liability for your use of this information.

## 2017-10-19 NOTE — PROGRESS NOTES
Bedside and verbal report received from [facility], [facility].

## 2017-10-19 NOTE — DISCHARGE SUMMARY
[address] [place] [address] 121 Cardiology Discharge Summary     Patient ID:  Belinda Ann  [id number]  79 y.o.  1950    Admit date: 10/18/2017    Discharge date:  10/19/2017    Admitting Physician: Pedro Kelsey MD     Discharge Physician: SERVANDO Fay/Dr. Robertson    Admission Diagnoses: Chest pain [R07.9]    Discharge Diagnoses:    Diagnosis    CAD (coronary artery disease)    Coronary artery disease of native artery of native heart with stable angina pectoris ([address]    Essential hypertension    Dyslipidemia    Controlled type 2 diabetes mellitus with diabetic polyneuropathy, with long-term current use of insulin [facility])       Cardiology Procedures this admission:  Left heart catheterization with PCI  Consults: None    Hospital Course: Patient was seen at the office of [address] [place] [address] Cardiology by Dr. Sheldon Flores  for complaints of chest pain w h/o CAD s/p prior PCI w DM and worsening dyspnea and was subsequently scheduled for a LHC at [facility] on 10/18/17. Patient underwent cardiac catheterization by Dr. Sheldon Flores. Patient was found to have a 95% stenosis of the pLAD that was stented with a  3.5 x 32 Synergy drug-eluting stent with 0% residual stenosis. Patient was found to have a 95% stenosis of the dRCA that was stented with a 3.0 x 38 Synergy drug-eluting stent with 0% residual stenosis. Patient tolerated the procedure well and was taken to the telemetry floor for recovery. The following morning patient was up feeling well without any complaints of chest pain or shortness of breath. Patient's right radial cath site was clean, dry and intact without hematoma or bruit. Patient's labs were WNL. Patient was seen and examined by Dr. Robertson and determined stable and ready for discharge. Patient was instructed on the importance of medication compliance including taking aspirin and plavix everyday without missing a dose.   Benazapril dose was increased due to elevated BP, he had a brief episode of wenkebach at 1 AM while asleep that was asymptomatic and BB dose was unchanged. After receiving drug eluting stents, the patient will remain on dual anti-platelet therapy for 1 year. For maximized medical therapy for CAD, patient will continue BB, ACE-I, and statin as well (statin dose maximized). The patient will follow up with [facility] Cardiology Dr. Nimco Chavez Oct 30 at [address] [facility]  and has been referred to cardiac rehab. 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 DISPOSITION: The patient is being discharged home in stable condition on a low saturated fat, low cholesterol and low salt diet. The patient is instructed to advance activities as tolerated to the limit of fatigue or shortness of breath. The patient is instructed to avoid all heavy lifting for 5 days. The patient is instructed to watch the cath site for bleeding/oozing; if seen, the patient is instructed to apply firm pressure with a clean cloth and call [facility] Cardiology at [phone]. The patient is instructed to watch for signs of infection which include: increasing area of redness, fever/hot to touch or purulent drainage at the catheterization site. The patient is instructed not to soak in a bathtub for 7-10 days, but is cleared to shower. The patient is instructed to call the office or return to the ER for immediate evaluation for any shortness of breath or chest pain not relieved by NTG. Discharge Exam:   Visit Vitals    /72 (BP 1 Location: Left arm)    Pulse 86    Temp 97.5 °F (36.4 °C)    Resp 18    Ht 6' 3\" (1.905 m)    Wt 136.9 kg (301 lb 12.8 oz)    SpO2 94%    BMI 37.72 kg/m2     Patient has been seen by Dr. Mario Greenberg: see his progress note for exam details.     Recent Results (from the past 24 hour(s))   GLUCOSE, POC    Collection Time: 10/18/17 10:41 AM   Result Value Ref Range    Glucose (POC) 298 (H) 65 - 100 mg/dL   EKG, 12 LEAD, INITIAL    Collection Time: 10/18/17  4:27 PM   Result Value Ref Range    Ventricular Rate 73 BPM Atrial Rate 73 BPM    P-R Interval 302 ms    QRS Duration 88 ms    Q-T Interval 412 ms    QTC Calculation (Bezet) 453 ms    Calculated P Axis 28 degrees    Calculated R Axis 49 degrees    Calculated T Axis 38 degrees    Diagnosis       Sinus rhythm with 1st degree A-V block  Otherwise normal ECG  No previous ECGs available  Confirmed by DIAMOND VARELA (), BENJAMIN HOLLIDAY ([id number]) on 10/18/2017 5:34:00 PM     GLUCOSE, POC    Collection Time: 10/18/17  4:28 PM   Result Value Ref Range    Glucose (POC) 235 (H) 65 - 100 mg/dL   GLUCOSE, POC    Collection Time: 10/18/17  8:41 PM   Result Value Ref Range    Glucose (POC) 347 (H) 65 - 554 mg/dL   METABOLIC PANEL, BASIC    Collection Time: 10/19/17  5:25 AM   Result Value Ref Range    Sodium 141 136 - 145 mmol/L    Potassium 3.6 3.5 - 5.1 mmol/L    Chloride 106 98 - 107 mmol/L    CO2 26 21 - 32 mmol/L    Anion gap 9 7 - 16 mmol/L    Glucose 193 (H) 65 - 100 mg/dL    BUN 20 8 - 23 MG/DL    Creatinine 1.03 0.8 - 1.5 MG/DL    GFR est AA >60 >60 ml/min/1.73m2    GFR est non-AA >60 >60 ml/min/1.73m2    Calcium 8.9 8.3 - 10.4 MG/DL   CBC WITH AUTOMATED DIFF    Collection Time: 10/19/17  5:25 AM   Result Value Ref Range    WBC 6.6 4.3 - 11.1 K/uL    RBC 4.28 4.23 - 5.67 M/uL    HGB 13.4 (L) 13.6 - 17.2 g/dL    HCT 40.1 (L) 41.1 - 50.3 %    MCV 93.7 79.6 - 97.8 FL    MCH 31.3 26.1 - 32.9 PG    MCHC 33.4 31.4 - 35.0 g/dL    RDW 13.3 11.9 - 14.6 %    PLATELET 819 501 - 132 K/uL    MPV 10.9 10.8 - 14.1 FL    DF AUTOMATED      NEUTROPHILS 68 43 - 78 %    LYMPHOCYTES 24 13 - 44 %    MONOCYTES 6 4.0 - 12.0 %    EOSINOPHILS 2 0.5 - 7.8 %    BASOPHILS 0 0.0 - 2.0 %    IMMATURE GRANULOCYTES 0.3 0.0 - 5.0 %    ABS. NEUTROPHILS 4.4 1.7 - 8.2 K/UL    ABS. LYMPHOCYTES 1.6 0.5 - 4.6 K/UL    ABS. MONOCYTES 0.4 0.1 - 1.3 K/UL    ABS. EOSINOPHILS 0.1 0.0 - 0.8 K/UL    ABS. BASOPHILS 0.0 0.0 - 0.2 K/UL    ABS. IMM.  GRANS. 0.0 0.0 - 0.5 K/UL   EKG, 12 LEAD, INITIAL    Collection Time: 10/19/17  6:28 AM   Result Value Ref Range    Ventricular Rate 75 BPM    Atrial Rate 75 BPM    P-R Interval 280 ms    QRS Duration 94 ms    Q-T Interval 418 ms    QTC Calculation (Bezet) 466 ms    Calculated P Axis 61 degrees    Calculated R Axis 70 degrees    Calculated T Axis 26 degrees    Diagnosis       Sinus rhythm with 1st degree A-V block  Otherwise normal ECG  When compared with ECG of 18-OCT-2017 16:27,  Nonspecific T wave abnormality now evident in Inferior leads     GLUCOSE, POC    Collection Time: 10/19/17  6:42 AM   Result Value Ref Range    Glucose (POC) 207 (H) 65 - 100 mg/dL         Patient Instructions:   Current Discharge Medication List      START taking these medications    Details   benazepril (LOTENSIN) 40 mg tablet Take 1 Tab by mouth daily. Qty: 30 Tab, Refills: 11      hydroCHLOROthiazide (HYDRODIURIL) 25 mg tablet Take 1 Tab by mouth daily. Qty: 30 Tab, Refills: 11      clopidogrel (PLAVIX) 75 mg tab Take 1 Tab by mouth daily. Qty: 30 Tab, Refills: 11      nitroglycerin (NITROSTAT) 0.4 mg SL tablet 1 Tab by SubLINGual route every five (5) minutes as needed for Chest Pain. Qty: 1 Bottle, Refills: 11         CONTINUE these medications which have CHANGED    Details   atorvastatin (LIPITOR) 80 mg tablet Take 1 Tab by mouth nightly. Qty: 30 Tab, Refills: 11         CONTINUE these medications which have NOT CHANGED    Details   omeprazole (PRILOSEC) 40 mg capsule Take 40 mg by mouth every other day. SITagliptin-metFORMIN (JANUMET) 50-1,000 mg per tablet Take 1 Tab by mouth two (2) times daily (with meals). oxyCODONE IR (ROXICODONE) 20 mg immediate release tablet Take 20 mg by mouth every six (6) hours as needed. carvedilol (COREG) 6.25 mg tablet Take  by mouth two (2) times daily (with meals). aspirin delayed-release 81 mg tablet Take  by mouth daily. insulin glargine (LANTUS) 100 unit/mL injection 30 Units by SubCUTAneous route nightly.       insulin lispro (HUMALOG) 100 unit/mL injection by SubCUTAneous route. Sliding scale with meals      albuterol (PROAIR HFA) 90 mcg/actuation inhaler Take 2 Puffs by inhalation every four (4) hours as needed.          STOP taking these medications       benazepril-hydroCHLOROthiazide (LOTENSIN HCT) 20-25 mg per tablet Comments:   Reason for Stopping:                 Signed:  Bebeto Warner PA-C  10/19/2017  7:51 AM

## 2017-10-19 NOTE — PROGRESS NOTES
Discharge instructions reviewed with patient. Prescriptions given for see discharge note and med info sheets provided for all new medications. Opportunity for questions provided. Patient voiced understanding of all discharge instructions. IVs removed and monitor off at discharge.

## 2017-10-19 NOTE — PROGRESS NOTES
Bedside and Verbal shift change report given to [address] [place] [address]. Report included the following information SBAR, Kardex, Accordion, Recent Results and Med Rec Status.

## 2017-10-19 NOTE — PROGRESS NOTES
Pt's HR dropping into the 40's. Asymptomatic. Rhythm appears to be Wenckebach. Dr. James Iverson updated and aware. No orders received at this time.

## 2017-10-19 NOTE — PROGRESS NOTES
Bedside report received from [address] [place] [address]. Pt resting in bed, denies complaints. WIll monitor.

## 2017-10-30 PROBLEM — I25.10 CAD (CORONARY ARTERY DISEASE): Status: RESOLVED | Noted: 2017-01-01 | Resolved: 2017-01-01

## 2018-01-01 ENCOUNTER — PATIENT OUTREACH (OUTPATIENT)
Dept: RESPIRATORY THERAPY | Age: 68
End: 2018-01-01

## 2018-01-01 ENCOUNTER — HOSPITAL ENCOUNTER (EMERGENCY)
Age: 68
Discharge: HOME | End: 2018-02-28
Attending: EMERGENCY MEDICINE
Payer: MEDICARE

## 2018-01-01 ENCOUNTER — APPOINTMENT (OUTPATIENT)
Dept: GENERAL RADIOLOGY | Age: 68
DRG: 190 | End: 2018-01-01
Attending: INTERNAL MEDICINE
Payer: MEDICARE

## 2018-01-01 ENCOUNTER — APPOINTMENT (OUTPATIENT)
Dept: CARDIAC REHAB | Age: 68
End: 2018-01-01

## 2018-01-01 ENCOUNTER — APPOINTMENT (OUTPATIENT)
Dept: CT IMAGING | Age: 68
DRG: 190 | End: 2018-01-01
Attending: INTERNAL MEDICINE
Payer: MEDICARE

## 2018-01-01 ENCOUNTER — HOSPITAL ENCOUNTER (INPATIENT)
Age: 68
LOS: 5 days | Discharge: HOME | DRG: 190 | End: 2018-03-11
Attending: INTERNAL MEDICINE | Admitting: INTERNAL MEDICINE
Payer: MEDICARE

## 2018-01-01 ENCOUNTER — PATIENT OUTREACH (OUTPATIENT)
Dept: CASE MANAGEMENT | Age: 68
End: 2018-01-01

## 2018-01-01 ENCOUNTER — APPOINTMENT (OUTPATIENT)
Dept: GENERAL RADIOLOGY | Age: 68
End: 2018-01-01
Attending: STUDENT IN AN ORGANIZED HEALTH CARE EDUCATION/TRAINING PROGRAM
Payer: MEDICARE

## 2018-01-01 VITALS
BODY MASS INDEX: 34.89 KG/M2 | SYSTOLIC BLOOD PRESSURE: 111 MMHG | RESPIRATION RATE: 18 BRPM | HEART RATE: 94 BPM | WEIGHT: 286.6 LBS | TEMPERATURE: 99 F | OXYGEN SATURATION: 99 % | DIASTOLIC BLOOD PRESSURE: 64 MMHG

## 2018-01-01 VITALS
SYSTOLIC BLOOD PRESSURE: 118 MMHG | HEIGHT: 77 IN | HEART RATE: 82 BPM | BODY MASS INDEX: 33.53 KG/M2 | RESPIRATION RATE: 16 BRPM | WEIGHT: 284 LBS | TEMPERATURE: 98 F | OXYGEN SATURATION: 92 % | DIASTOLIC BLOOD PRESSURE: 60 MMHG

## 2018-01-01 VITALS
HEIGHT: 77 IN | WEIGHT: 300 LBS | DIASTOLIC BLOOD PRESSURE: 78 MMHG | RESPIRATION RATE: 20 BRPM | TEMPERATURE: 98.2 F | OXYGEN SATURATION: 96 % | HEART RATE: 86 BPM | SYSTOLIC BLOOD PRESSURE: 142 MMHG | BODY MASS INDEX: 35.42 KG/M2

## 2018-01-01 VITALS
RESPIRATION RATE: 20 BRPM | DIASTOLIC BLOOD PRESSURE: 70 MMHG | WEIGHT: 281 LBS | HEART RATE: 82 BPM | HEIGHT: 77 IN | TEMPERATURE: 98.7 F | BODY MASS INDEX: 33.18 KG/M2 | OXYGEN SATURATION: 94 % | SYSTOLIC BLOOD PRESSURE: 142 MMHG

## 2018-01-01 VITALS
HEIGHT: 77 IN | SYSTOLIC BLOOD PRESSURE: 148 MMHG | OXYGEN SATURATION: 89 % | BODY MASS INDEX: 33.06 KG/M2 | HEART RATE: 88 BPM | TEMPERATURE: 98.4 F | RESPIRATION RATE: 16 BRPM | WEIGHT: 280 LBS | DIASTOLIC BLOOD PRESSURE: 72 MMHG

## 2018-01-01 VITALS
BODY MASS INDEX: 32.94 KG/M2 | HEART RATE: 76 BPM | DIASTOLIC BLOOD PRESSURE: 72 MMHG | TEMPERATURE: 98.6 F | RESPIRATION RATE: 18 BRPM | SYSTOLIC BLOOD PRESSURE: 124 MMHG | WEIGHT: 279 LBS | OXYGEN SATURATION: 97 % | HEIGHT: 77 IN

## 2018-01-01 VITALS
SYSTOLIC BLOOD PRESSURE: 142 MMHG | HEART RATE: 72 BPM | HEIGHT: 76 IN | DIASTOLIC BLOOD PRESSURE: 78 MMHG | WEIGHT: 280 LBS | RESPIRATION RATE: 18 BRPM | OXYGEN SATURATION: 98 % | BODY MASS INDEX: 34.1 KG/M2

## 2018-01-01 VITALS
WEIGHT: 284 LBS | OXYGEN SATURATION: 96 % | HEIGHT: 77 IN | TEMPERATURE: 98.2 F | BODY MASS INDEX: 33.53 KG/M2 | DIASTOLIC BLOOD PRESSURE: 72 MMHG | RESPIRATION RATE: 18 BRPM | SYSTOLIC BLOOD PRESSURE: 124 MMHG

## 2018-01-01 VITALS
TEMPERATURE: 98.3 F | OXYGEN SATURATION: 94 % | HEART RATE: 78 BPM | HEIGHT: 77 IN | SYSTOLIC BLOOD PRESSURE: 142 MMHG | DIASTOLIC BLOOD PRESSURE: 68 MMHG | RESPIRATION RATE: 20 BRPM | BODY MASS INDEX: 35.54 KG/M2 | WEIGHT: 301 LBS

## 2018-01-01 VITALS
WEIGHT: 300 LBS | OXYGEN SATURATION: 96 % | TEMPERATURE: 98.7 F | HEART RATE: 85 BPM | DIASTOLIC BLOOD PRESSURE: 68 MMHG | RESPIRATION RATE: 20 BRPM | SYSTOLIC BLOOD PRESSURE: 136 MMHG | HEIGHT: 77 IN | BODY MASS INDEX: 35.42 KG/M2

## 2018-01-01 VITALS
HEART RATE: 78 BPM | OXYGEN SATURATION: 95 % | HEIGHT: 77 IN | BODY MASS INDEX: 35.19 KG/M2 | SYSTOLIC BLOOD PRESSURE: 144 MMHG | DIASTOLIC BLOOD PRESSURE: 80 MMHG | TEMPERATURE: 98.2 F | RESPIRATION RATE: 18 BRPM | WEIGHT: 298 LBS

## 2018-01-01 VITALS
DIASTOLIC BLOOD PRESSURE: 76 MMHG | BODY MASS INDEX: 33.06 KG/M2 | WEIGHT: 280 LBS | OXYGEN SATURATION: 96 % | SYSTOLIC BLOOD PRESSURE: 132 MMHG | RESPIRATION RATE: 18 BRPM | TEMPERATURE: 98.7 F | HEIGHT: 77 IN | HEART RATE: 84 BPM

## 2018-01-01 DIAGNOSIS — R07.89 ATYPICAL CHEST PAIN: Primary | ICD-10-CM

## 2018-01-01 DIAGNOSIS — J20.9 ACUTE BRONCHITIS, UNSPECIFIED ORGANISM: ICD-10-CM

## 2018-01-01 LAB
ALBUMIN SERPL-MCNC: 2.4 G/DL (ref 3.2–4.6)
ALBUMIN SERPL-MCNC: 2.7 G/DL (ref 3.2–4.6)
ALBUMIN SERPL-MCNC: 3.3 G/DL (ref 3.2–4.6)
ALBUMIN/GLOB SERPL: 0.6 {RATIO} (ref 1.2–3.5)
ALBUMIN/GLOB SERPL: 0.6 {RATIO} (ref 1.2–3.5)
ALBUMIN/GLOB SERPL: 0.7 {RATIO} (ref 1.2–3.5)
ALP SERPL-CCNC: 80 U/L (ref 50–136)
ALP SERPL-CCNC: 91 U/L (ref 50–136)
ALP SERPL-CCNC: 99 U/L (ref 50–136)
ALT SERPL-CCNC: 15 U/L (ref 12–65)
ALT SERPL-CCNC: 15 U/L (ref 12–65)
ALT SERPL-CCNC: 27 U/L (ref 12–65)
ANION GAP SERPL CALC-SCNC: 6 MMOL/L (ref 7–16)
ANION GAP SERPL CALC-SCNC: 8 MMOL/L (ref 7–16)
ANION GAP SERPL CALC-SCNC: 9 MMOL/L (ref 7–16)
APPEARANCE UR: CLEAR
ARTERIAL PATENCY WRIST A: POSITIVE
AST SERPL-CCNC: 10 U/L (ref 15–37)
AST SERPL-CCNC: 10 U/L (ref 15–37)
AST SERPL-CCNC: 28 U/L (ref 15–37)
ATRIAL RATE: 90 BPM
ATRIAL RATE: 91 BPM
BACTERIA URNS QL MICRO: 0 /HPF
BASE EXCESS BLDA CALC-SCNC: 4 MMOL/L (ref 0–3)
BASOPHILS # BLD: 0 K/UL (ref 0–0.2)
BASOPHILS NFR BLD: 0 % (ref 0–2)
BDY SITE: ABNORMAL
BILIRUB SERPL-MCNC: 0.3 MG/DL (ref 0.2–1.1)
BILIRUB UR QL: NEGATIVE
BUN SERPL-MCNC: 20 MG/DL (ref 8–23)
BUN SERPL-MCNC: 24 MG/DL (ref 8–23)
BUN SERPL-MCNC: 27 MG/DL (ref 8–23)
BUN SERPL-MCNC: 28 MG/DL (ref 8–23)
BUN SERPL-MCNC: 30 MG/DL (ref 8–23)
BUN SERPL-MCNC: 31 MG/DL (ref 8–23)
CALCIUM SERPL-MCNC: 8.4 MG/DL (ref 8.3–10.4)
CALCIUM SERPL-MCNC: 8.5 MG/DL (ref 8.3–10.4)
CALCIUM SERPL-MCNC: 8.5 MG/DL (ref 8.3–10.4)
CALCIUM SERPL-MCNC: 8.7 MG/DL (ref 8.3–10.4)
CALCIUM SERPL-MCNC: 8.8 MG/DL (ref 8.3–10.4)
CALCIUM SERPL-MCNC: 8.9 MG/DL (ref 8.3–10.4)
CALCULATED P AXIS, ECG09: 49 DEGREES
CALCULATED P AXIS, ECG09: 66 DEGREES
CALCULATED R AXIS, ECG10: 77 DEGREES
CALCULATED R AXIS, ECG10: 78 DEGREES
CALCULATED T AXIS, ECG11: 70 DEGREES
CALCULATED T AXIS, ECG11: 72 DEGREES
CASTS URNS QL MICRO: ABNORMAL /LPF
CHLORIDE SERPL-SCNC: 100 MMOL/L (ref 98–107)
CHLORIDE SERPL-SCNC: 100 MMOL/L (ref 98–107)
CHLORIDE SERPL-SCNC: 101 MMOL/L (ref 98–107)
CHLORIDE SERPL-SCNC: 102 MMOL/L (ref 98–107)
CHLORIDE SERPL-SCNC: 102 MMOL/L (ref 98–107)
CHLORIDE SERPL-SCNC: 103 MMOL/L (ref 98–107)
CO2 SERPL-SCNC: 29 MMOL/L (ref 21–32)
CO2 SERPL-SCNC: 29 MMOL/L (ref 21–32)
CO2 SERPL-SCNC: 30 MMOL/L (ref 21–32)
CO2 SERPL-SCNC: 31 MMOL/L (ref 21–32)
CO2 SERPL-SCNC: 32 MMOL/L (ref 21–32)
CO2 SERPL-SCNC: 32 MMOL/L (ref 21–32)
COHGB MFR BLD: 0.9 % (ref 0.5–1.5)
COLOR UR: YELLOW
CREAT SERPL-MCNC: 0.99 MG/DL (ref 0.8–1.5)
CREAT SERPL-MCNC: 0.99 MG/DL (ref 0.8–1.5)
CREAT SERPL-MCNC: 1.08 MG/DL (ref 0.8–1.5)
CREAT SERPL-MCNC: 1.08 MG/DL (ref 0.8–1.5)
CREAT SERPL-MCNC: 1.21 MG/DL (ref 0.8–1.5)
CREAT SERPL-MCNC: 1.39 MG/DL (ref 0.8–1.5)
D DIMER PPP FEU-MCNC: 1.17 UG/ML(FEU)
DIAGNOSIS, 93000: NORMAL
DIAGNOSIS, 93000: NORMAL
DIFFERENTIAL METHOD BLD: ABNORMAL
DO-HGB BLD-MCNC: 6 % (ref 0–5)
EOSINOPHIL # BLD: 0 K/UL (ref 0–0.8)
EOSINOPHIL # BLD: 0.1 K/UL (ref 0–0.8)
EOSINOPHIL # BLD: 0.1 K/UL (ref 0–0.8)
EOSINOPHIL NFR BLD: 0 % (ref 0.5–7.8)
EOSINOPHIL NFR BLD: 1 % (ref 0.5–7.8)
EOSINOPHIL NFR BLD: 1 % (ref 0.5–7.8)
EPI CELLS #/AREA URNS HPF: ABNORMAL /HPF
ERYTHROCYTE [DISTWIDTH] IN BLOOD BY AUTOMATED COUNT: 13.6 % (ref 11.9–14.6)
ERYTHROCYTE [DISTWIDTH] IN BLOOD BY AUTOMATED COUNT: 13.7 % (ref 11.9–14.6)
ERYTHROCYTE [DISTWIDTH] IN BLOOD BY AUTOMATED COUNT: 13.7 % (ref 11.9–14.6)
ERYTHROCYTE [DISTWIDTH] IN BLOOD BY AUTOMATED COUNT: 13.8 % (ref 11.9–14.6)
EST. AVERAGE GLUCOSE BLD GHB EST-MCNC: 249 MG/DL
FIO2 ON VENT: 28 %
GAS FLOW.O2 O2 DELIVERY SYS: 2 L/MIN
GLOBULIN SER CALC-MCNC: 4.2 G/DL (ref 2.3–3.5)
GLOBULIN SER CALC-MCNC: 4.8 G/DL (ref 2.3–3.5)
GLOBULIN SER CALC-MCNC: 4.9 G/DL (ref 2.3–3.5)
GLUCOSE BLD STRIP.AUTO-MCNC: 208 MG/DL (ref 65–100)
GLUCOSE BLD STRIP.AUTO-MCNC: 233 MG/DL (ref 65–100)
GLUCOSE BLD STRIP.AUTO-MCNC: 247 MG/DL (ref 65–100)
GLUCOSE BLD STRIP.AUTO-MCNC: 252 MG/DL (ref 65–100)
GLUCOSE BLD STRIP.AUTO-MCNC: 254 MG/DL (ref 65–100)
GLUCOSE BLD STRIP.AUTO-MCNC: 257 MG/DL (ref 65–100)
GLUCOSE BLD STRIP.AUTO-MCNC: 263 MG/DL (ref 65–100)
GLUCOSE BLD STRIP.AUTO-MCNC: 278 MG/DL (ref 65–100)
GLUCOSE BLD STRIP.AUTO-MCNC: 279 MG/DL (ref 65–100)
GLUCOSE BLD STRIP.AUTO-MCNC: 306 MG/DL (ref 65–100)
GLUCOSE BLD STRIP.AUTO-MCNC: 310 MG/DL (ref 65–100)
GLUCOSE BLD STRIP.AUTO-MCNC: 317 MG/DL (ref 65–100)
GLUCOSE BLD STRIP.AUTO-MCNC: 317 MG/DL (ref 65–100)
GLUCOSE BLD STRIP.AUTO-MCNC: 324 MG/DL (ref 65–100)
GLUCOSE BLD STRIP.AUTO-MCNC: 325 MG/DL (ref 65–100)
GLUCOSE BLD STRIP.AUTO-MCNC: 325 MG/DL (ref 65–100)
GLUCOSE BLD STRIP.AUTO-MCNC: 326 MG/DL (ref 65–100)
GLUCOSE BLD STRIP.AUTO-MCNC: 327 MG/DL (ref 65–100)
GLUCOSE BLD STRIP.AUTO-MCNC: 331 MG/DL (ref 65–100)
GLUCOSE BLD STRIP.AUTO-MCNC: 358 MG/DL (ref 65–100)
GLUCOSE BLD STRIP.AUTO-MCNC: 382 MG/DL (ref 65–100)
GLUCOSE BLD STRIP.AUTO-MCNC: 393 MG/DL (ref 65–100)
GLUCOSE BLD STRIP.AUTO-MCNC: 394 MG/DL (ref 65–100)
GLUCOSE BLD STRIP.AUTO-MCNC: 410 MG/DL (ref 65–100)
GLUCOSE BLD STRIP.AUTO-MCNC: 454 MG/DL (ref 65–100)
GLUCOSE BLD STRIP.AUTO-MCNC: 478 MG/DL (ref 65–100)
GLUCOSE BLD STRIP.AUTO-MCNC: 489 MG/DL (ref 65–100)
GLUCOSE SERPL-MCNC: 203 MG/DL (ref 65–100)
GLUCOSE SERPL-MCNC: 232 MG/DL (ref 65–100)
GLUCOSE SERPL-MCNC: 272 MG/DL (ref 65–100)
GLUCOSE SERPL-MCNC: 284 MG/DL (ref 65–100)
GLUCOSE SERPL-MCNC: 291 MG/DL (ref 65–100)
GLUCOSE SERPL-MCNC: 372 MG/DL (ref 65–100)
GLUCOSE UR STRIP.AUTO-MCNC: >1000 MG/DL
HBA1C MFR BLD: 10.3 % (ref 4.8–6)
HCO3 BLDA-SCNC: 28 MMOL/L (ref 22–26)
HCT VFR BLD AUTO: 37.7 % (ref 41.1–50.3)
HCT VFR BLD AUTO: 38 % (ref 41.1–50.3)
HCT VFR BLD AUTO: 38.2 % (ref 41.1–50.3)
HCT VFR BLD AUTO: 39.8 % (ref 41.1–50.3)
HCT VFR BLD AUTO: 40.9 % (ref 41.1–50.3)
HCT VFR BLD AUTO: 45.3 % (ref 41.1–50.3)
HGB BLD-MCNC: 12.2 G/DL (ref 13.6–17.2)
HGB BLD-MCNC: 12.3 G/DL (ref 13.6–17.2)
HGB BLD-MCNC: 12.3 G/DL (ref 13.6–17.2)
HGB BLD-MCNC: 13 G/DL (ref 13.6–17.2)
HGB BLD-MCNC: 13.4 G/DL (ref 13.6–17.2)
HGB BLD-MCNC: 14.9 G/DL (ref 13.6–17.2)
HGB BLDMV-MCNC: 13.2 GM/DL (ref 11.7–15)
HGB UR QL STRIP: NEGATIVE
IMM GRANULOCYTES # BLD: 0 K/UL (ref 0–0.5)
IMM GRANULOCYTES NFR BLD AUTO: 0 % (ref 0–5)
KETONES UR QL STRIP.AUTO: 40 MG/DL
LEUKOCYTE ESTERASE UR QL STRIP.AUTO: NEGATIVE
LYMPHOCYTES # BLD: 1 K/UL (ref 0.5–4.6)
LYMPHOCYTES # BLD: 1.1 K/UL (ref 0.5–4.6)
LYMPHOCYTES # BLD: 1.7 K/UL (ref 0.5–4.6)
LYMPHOCYTES # BLD: 2.1 K/UL (ref 0.5–4.6)
LYMPHOCYTES # BLD: 2.2 K/UL (ref 0.5–4.6)
LYMPHOCYTES NFR BLD: 17 % (ref 13–44)
LYMPHOCYTES NFR BLD: 18 % (ref 13–44)
LYMPHOCYTES NFR BLD: 18 % (ref 13–44)
LYMPHOCYTES NFR BLD: 20 % (ref 13–44)
LYMPHOCYTES NFR BLD: 23 % (ref 13–44)
MCH RBC QN AUTO: 30.1 PG (ref 26.1–32.9)
MCH RBC QN AUTO: 30.5 PG (ref 26.1–32.9)
MCH RBC QN AUTO: 30.5 PG (ref 26.1–32.9)
MCH RBC QN AUTO: 30.6 PG (ref 26.1–32.9)
MCH RBC QN AUTO: 30.8 PG (ref 26.1–32.9)
MCH RBC QN AUTO: 31.4 PG (ref 26.1–32.9)
MCHC RBC AUTO-ENTMCNC: 31.9 G/DL (ref 31.4–35)
MCHC RBC AUTO-ENTMCNC: 32.4 G/DL (ref 31.4–35)
MCHC RBC AUTO-ENTMCNC: 32.6 G/DL (ref 31.4–35)
MCHC RBC AUTO-ENTMCNC: 32.7 G/DL (ref 31.4–35)
MCHC RBC AUTO-ENTMCNC: 32.8 G/DL (ref 31.4–35)
MCHC RBC AUTO-ENTMCNC: 32.9 G/DL (ref 31.4–35)
MCV RBC AUTO: 93.4 FL (ref 79.6–97.8)
MCV RBC AUTO: 93.5 FL (ref 79.6–97.8)
MCV RBC AUTO: 94 FL (ref 79.6–97.8)
MCV RBC AUTO: 94.3 FL (ref 79.6–97.8)
MCV RBC AUTO: 94.5 FL (ref 79.6–97.8)
MCV RBC AUTO: 95.4 FL (ref 79.6–97.8)
METHGB MFR BLD: 0.2 % (ref 0–1.5)
MM INDURATION POC: NORMAL MM (ref 0–5)
MM INDURATION POC: NORMAL MM (ref 0–5)
MONOCYTES # BLD: 0.5 K/UL (ref 0.1–1.3)
MONOCYTES # BLD: 0.6 K/UL (ref 0.1–1.3)
MONOCYTES # BLD: 0.6 K/UL (ref 0.1–1.3)
MONOCYTES NFR BLD: 11 % (ref 4–12)
MONOCYTES NFR BLD: 5 % (ref 4–12)
MONOCYTES NFR BLD: 5 % (ref 4–12)
MONOCYTES NFR BLD: 6 % (ref 4–12)
MONOCYTES NFR BLD: 8 % (ref 4–12)
NEUTS SEG # BLD: 3.5 K/UL (ref 1.7–8.2)
NEUTS SEG # BLD: 4.5 K/UL (ref 1.7–8.2)
NEUTS SEG # BLD: 6.5 K/UL (ref 1.7–8.2)
NEUTS SEG # BLD: 7.3 K/UL (ref 1.7–8.2)
NEUTS SEG # BLD: 9.3 K/UL (ref 1.7–8.2)
NEUTS SEG NFR BLD: 69 % (ref 43–78)
NEUTS SEG NFR BLD: 70 % (ref 43–78)
NEUTS SEG NFR BLD: 73 % (ref 43–78)
NEUTS SEG NFR BLD: 77 % (ref 43–78)
NEUTS SEG NFR BLD: 78 % (ref 43–78)
NITRITE UR QL STRIP.AUTO: NEGATIVE
OXYHGB MFR BLDA: 93.3 % (ref 94–97)
P-R INTERVAL, ECG05: 192 MS
P-R INTERVAL, ECG05: 212 MS
PCO2 BLDA: 38 MMHG (ref 35–45)
PH BLDA: 7.48 [PH] (ref 7.35–7.45)
PH UR STRIP: 7 [PH] (ref 5–9)
PLATELET # BLD AUTO: 208 K/UL (ref 150–450)
PLATELET # BLD AUTO: 369 K/UL (ref 150–450)
PLATELET # BLD AUTO: 428 K/UL (ref 150–450)
PLATELET # BLD AUTO: 431 K/UL (ref 150–450)
PLATELET # BLD AUTO: 436 K/UL (ref 150–450)
PLATELET # BLD AUTO: 449 K/UL (ref 150–450)
PMV BLD AUTO: 10.9 FL (ref 10.8–14.1)
PMV BLD AUTO: 11 FL (ref 10.8–14.1)
PMV BLD AUTO: 11.1 FL (ref 10.8–14.1)
PMV BLD AUTO: 11.2 FL (ref 10.8–14.1)
PMV BLD AUTO: 11.3 FL (ref 10.8–14.1)
PMV BLD AUTO: 11.8 FL (ref 10.8–14.1)
PO2 BLDA: 71 MMHG (ref 80–105)
POTASSIUM SERPL-SCNC: 3.7 MMOL/L (ref 3.5–5.1)
POTASSIUM SERPL-SCNC: 3.8 MMOL/L (ref 3.5–5.1)
POTASSIUM SERPL-SCNC: 4 MMOL/L (ref 3.5–5.1)
POTASSIUM SERPL-SCNC: 4 MMOL/L (ref 3.5–5.1)
POTASSIUM SERPL-SCNC: 4.1 MMOL/L (ref 3.5–5.1)
POTASSIUM SERPL-SCNC: 4.5 MMOL/L (ref 3.5–5.1)
PPD POC: NORMAL NEGATIVE
PPD POC: NORMAL NEGATIVE
PROT SERPL-MCNC: 6.6 G/DL (ref 6.3–8.2)
PROT SERPL-MCNC: 7.5 G/DL (ref 6.3–8.2)
PROT SERPL-MCNC: 8.2 G/DL (ref 6.3–8.2)
PROT UR STRIP-MCNC: 100 MG/DL
Q-T INTERVAL, ECG07: 374 MS
Q-T INTERVAL, ECG07: 402 MS
QRS DURATION, ECG06: 84 MS
QRS DURATION, ECG06: 86 MS
QTC CALCULATION (BEZET), ECG08: 457 MS
QTC CALCULATION (BEZET), ECG08: 494 MS
RBC # BLD AUTO: 4.02 M/UL (ref 4.23–5.67)
RBC # BLD AUTO: 4.03 M/UL (ref 4.23–5.67)
RBC # BLD AUTO: 4.05 M/UL (ref 4.23–5.67)
RBC # BLD AUTO: 4.26 M/UL (ref 4.23–5.67)
RBC # BLD AUTO: 4.35 M/UL (ref 4.23–5.67)
RBC # BLD AUTO: 4.75 M/UL (ref 4.23–5.67)
RBC #/AREA URNS HPF: ABNORMAL /HPF
SAO2 % BLD: 94 % (ref 92–98.5)
SODIUM SERPL-SCNC: 138 MMOL/L (ref 136–145)
SODIUM SERPL-SCNC: 139 MMOL/L (ref 136–145)
SODIUM SERPL-SCNC: 139 MMOL/L (ref 136–145)
SODIUM SERPL-SCNC: 140 MMOL/L (ref 136–145)
SODIUM SERPL-SCNC: 140 MMOL/L (ref 136–145)
SODIUM SERPL-SCNC: 142 MMOL/L (ref 136–145)
SP GR UR REFRACTOMETRY: 1.03 (ref 1–1.02)
TROPONIN I BLD-MCNC: 0.02 NG/ML (ref 0.02–0.05)
TROPONIN I SERPL-MCNC: <0.02 NG/ML (ref 0.02–0.05)
UROBILINOGEN UR QL STRIP.AUTO: 1 EU/DL (ref 0.2–1)
VENTILATION MODE VENT: ABNORMAL
VENTRICULAR RATE, ECG03: 90 BPM
VENTRICULAR RATE, ECG03: 91 BPM
WBC # BLD AUTO: 12 K/UL (ref 4.3–11.1)
WBC # BLD AUTO: 14 K/UL (ref 4.3–11.1)
WBC # BLD AUTO: 5.1 K/UL (ref 4.3–11.1)
WBC # BLD AUTO: 6.2 K/UL (ref 4.3–11.1)
WBC # BLD AUTO: 9.4 K/UL (ref 4.3–11.1)
WBC # BLD AUTO: 9.5 K/UL (ref 4.3–11.1)
WBC URNS QL MICRO: 0 /HPF

## 2018-01-01 PROCEDURE — 82962 GLUCOSE BLOOD TEST: CPT

## 2018-01-01 PROCEDURE — 36600 WITHDRAWAL OF ARTERIAL BLOOD: CPT

## 2018-01-01 PROCEDURE — 80048 BASIC METABOLIC PNL TOTAL CA: CPT | Performed by: INTERNAL MEDICINE

## 2018-01-01 PROCEDURE — 93005 ELECTROCARDIOGRAM TRACING: CPT | Performed by: STUDENT IN AN ORGANIZED HEALTH CARE EDUCATION/TRAINING PROGRAM

## 2018-01-01 PROCEDURE — 94640 AIRWAY INHALATION TREATMENT: CPT

## 2018-01-01 PROCEDURE — 74011250637 HC RX REV CODE- 250/637: Performed by: INTERNAL MEDICINE

## 2018-01-01 PROCEDURE — 74011636637 HC RX REV CODE- 636/637: Performed by: HOSPITALIST

## 2018-01-01 PROCEDURE — 85025 COMPLETE CBC W/AUTO DIFF WBC: CPT | Performed by: INTERNAL MEDICINE

## 2018-01-01 PROCEDURE — 94760 N-INVAS EAR/PLS OXIMETRY 1: CPT

## 2018-01-01 PROCEDURE — 74011636637 HC RX REV CODE- 636/637: Performed by: INTERNAL MEDICINE

## 2018-01-01 PROCEDURE — 82803 BLOOD GASES ANY COMBINATION: CPT

## 2018-01-01 PROCEDURE — 80053 COMPREHEN METABOLIC PANEL: CPT | Performed by: INTERNAL MEDICINE

## 2018-01-01 PROCEDURE — 77010033678 HC OXYGEN DAILY

## 2018-01-01 PROCEDURE — 74011250636 HC RX REV CODE- 250/636: Performed by: INTERNAL MEDICINE

## 2018-01-01 PROCEDURE — 74011000250 HC RX REV CODE- 250: Performed by: INTERNAL MEDICINE

## 2018-01-01 PROCEDURE — 65270000029 HC RM PRIVATE

## 2018-01-01 PROCEDURE — 71045 X-RAY EXAM CHEST 1 VIEW: CPT

## 2018-01-01 PROCEDURE — 99284 EMERGENCY DEPT VISIT MOD MDM: CPT | Performed by: EMERGENCY MEDICINE

## 2018-01-01 PROCEDURE — 36415 COLL VENOUS BLD VENIPUNCTURE: CPT | Performed by: INTERNAL MEDICINE

## 2018-01-01 PROCEDURE — 85379 FIBRIN DEGRADATION QUANT: CPT | Performed by: INTERNAL MEDICINE

## 2018-01-01 PROCEDURE — 65660000000 HC RM CCU STEPDOWN

## 2018-01-01 PROCEDURE — 84484 ASSAY OF TROPONIN QUANT: CPT | Performed by: INTERNAL MEDICINE

## 2018-01-01 PROCEDURE — 83036 HEMOGLOBIN GLYCOSYLATED A1C: CPT | Performed by: INTERNAL MEDICINE

## 2018-01-01 PROCEDURE — 93005 ELECTROCARDIOGRAM TRACING: CPT | Performed by: INTERNAL MEDICINE

## 2018-01-01 PROCEDURE — 77030012341 HC CHMB SPCR OPTC MDI VYRM -A

## 2018-01-01 PROCEDURE — 70450 CT HEAD/BRAIN W/O DYE: CPT

## 2018-01-01 PROCEDURE — 85025 COMPLETE CBC W/AUTO DIFF WBC: CPT | Performed by: STUDENT IN AN ORGANIZED HEALTH CARE EDUCATION/TRAINING PROGRAM

## 2018-01-01 PROCEDURE — 97161 PT EVAL LOW COMPLEX 20 MIN: CPT

## 2018-01-01 PROCEDURE — 81001 URINALYSIS AUTO W/SCOPE: CPT | Performed by: INTERNAL MEDICINE

## 2018-01-01 PROCEDURE — 84484 ASSAY OF TROPONIN QUANT: CPT | Performed by: STUDENT IN AN ORGANIZED HEALTH CARE EDUCATION/TRAINING PROGRAM

## 2018-01-01 PROCEDURE — 97166 OT EVAL MOD COMPLEX 45 MIN: CPT

## 2018-01-01 PROCEDURE — 97530 THERAPEUTIC ACTIVITIES: CPT

## 2018-01-01 PROCEDURE — 85027 COMPLETE CBC AUTOMATED: CPT | Performed by: INTERNAL MEDICINE

## 2018-01-01 PROCEDURE — 86580 TB INTRADERMAL TEST: CPT | Performed by: INTERNAL MEDICINE

## 2018-01-01 PROCEDURE — 80053 COMPREHEN METABOLIC PANEL: CPT | Performed by: STUDENT IN AN ORGANIZED HEALTH CARE EDUCATION/TRAINING PROGRAM

## 2018-01-01 PROCEDURE — 77030021668 HC NEB PREFIL KT VYRM -A

## 2018-01-01 PROCEDURE — 74011000302 HC RX REV CODE- 302: Performed by: INTERNAL MEDICINE

## 2018-01-01 RX ORDER — PANTOPRAZOLE SODIUM 40 MG/1
40 TABLET, DELAYED RELEASE ORAL
Status: DISCONTINUED | OUTPATIENT
Start: 2018-01-01 | End: 2018-01-01

## 2018-01-01 RX ORDER — ACETAMINOPHEN 325 MG/1
650 TABLET ORAL
Status: DISCONTINUED | OUTPATIENT
Start: 2018-01-01 | End: 2018-01-01 | Stop reason: HOSPADM

## 2018-01-01 RX ORDER — AZITHROMYCIN 250 MG/1
TABLET, FILM COATED ORAL
Qty: 6 TAB | Refills: 0 | Status: SHIPPED | OUTPATIENT
Start: 2018-01-01 | End: 2018-01-01

## 2018-01-01 RX ORDER — INSULIN LISPRO 100 [IU]/ML
INJECTION, SOLUTION INTRAVENOUS; SUBCUTANEOUS
Status: DISCONTINUED | OUTPATIENT
Start: 2018-01-01 | End: 2018-01-01 | Stop reason: HOSPADM

## 2018-01-01 RX ORDER — NAPROXEN SODIUM 550 MG/1
550 TABLET ORAL 2 TIMES DAILY WITH MEALS
Qty: 20 TAB | Refills: 0 | Status: SHIPPED | OUTPATIENT
Start: 2018-01-01 | End: 2018-01-01

## 2018-01-01 RX ORDER — GUAIFENESIN 600 MG/1
600 TABLET, EXTENDED RELEASE ORAL EVERY 12 HOURS
Qty: 30 TAB | Refills: 0 | Status: SHIPPED | OUTPATIENT
Start: 2018-01-01 | End: 2018-01-01

## 2018-01-01 RX ORDER — ENOXAPARIN SODIUM 100 MG/ML
40 INJECTION SUBCUTANEOUS EVERY 24 HOURS
Status: DISCONTINUED | OUTPATIENT
Start: 2018-01-01 | End: 2018-01-01 | Stop reason: HOSPADM

## 2018-01-01 RX ORDER — POVIDONE-IODINE 10 %
SOLUTION, NON-ORAL TOPICAL DAILY
Status: DISCONTINUED | OUTPATIENT
Start: 2018-01-01 | End: 2018-01-01 | Stop reason: HOSPADM

## 2018-01-01 RX ORDER — PREDNISONE 20 MG/1
40 TABLET ORAL
Status: COMPLETED | OUTPATIENT
Start: 2018-01-01 | End: 2018-01-01

## 2018-01-01 RX ORDER — GUAIFENESIN 600 MG/1
600 TABLET, EXTENDED RELEASE ORAL EVERY 12 HOURS
Status: DISCONTINUED | OUTPATIENT
Start: 2018-01-01 | End: 2018-01-01 | Stop reason: HOSPADM

## 2018-01-01 RX ORDER — PANTOPRAZOLE SODIUM 40 MG/1
40 TABLET, DELAYED RELEASE ORAL
Status: COMPLETED | OUTPATIENT
Start: 2018-01-01 | End: 2018-01-01

## 2018-01-01 RX ORDER — INSULIN GLARGINE 100 [IU]/ML
45 INJECTION, SOLUTION SUBCUTANEOUS
Status: DISCONTINUED | OUTPATIENT
Start: 2018-01-01 | End: 2018-01-01

## 2018-01-01 RX ORDER — ASPIRIN 81 MG/1
81 TABLET ORAL DAILY
Status: DISCONTINUED | OUTPATIENT
Start: 2018-01-01 | End: 2018-01-01 | Stop reason: HOSPADM

## 2018-01-01 RX ORDER — BENAZEPRIL HYDROCHLORIDE 10 MG/1
40 TABLET ORAL DAILY
Status: DISCONTINUED | OUTPATIENT
Start: 2018-01-01 | End: 2018-01-01 | Stop reason: HOSPADM

## 2018-01-01 RX ORDER — INSULIN GLARGINE 100 [IU]/ML
35 INJECTION, SOLUTION SUBCUTANEOUS
Status: DISCONTINUED | OUTPATIENT
Start: 2018-01-01 | End: 2018-01-01

## 2018-01-01 RX ORDER — INSULIN GLARGINE 100 [IU]/ML
30 INJECTION, SOLUTION SUBCUTANEOUS
Status: DISCONTINUED | OUTPATIENT
Start: 2018-01-01 | End: 2018-01-01

## 2018-01-01 RX ORDER — INSULIN LISPRO 100 [IU]/ML
3 INJECTION, SOLUTION INTRAVENOUS; SUBCUTANEOUS
Status: DISCONTINUED | OUTPATIENT
Start: 2018-01-01 | End: 2018-01-01 | Stop reason: HOSPADM

## 2018-01-01 RX ORDER — CLOPIDOGREL BISULFATE 75 MG/1
75 TABLET ORAL DAILY
Status: DISCONTINUED | OUTPATIENT
Start: 2018-01-01 | End: 2018-01-01 | Stop reason: HOSPADM

## 2018-01-01 RX ORDER — PREDNISONE 20 MG/1
40 TABLET ORAL
Status: DISCONTINUED | OUTPATIENT
Start: 2018-01-01 | End: 2018-01-01

## 2018-01-01 RX ORDER — ONDANSETRON 2 MG/ML
4 INJECTION INTRAMUSCULAR; INTRAVENOUS EVERY 6 HOURS
Status: DISCONTINUED | OUTPATIENT
Start: 2018-01-01 | End: 2018-01-01 | Stop reason: HOSPADM

## 2018-01-01 RX ORDER — INSULIN LISPRO 100 [IU]/ML
15 INJECTION, SOLUTION INTRAVENOUS; SUBCUTANEOUS ONCE
Status: COMPLETED | OUTPATIENT
Start: 2018-01-01 | End: 2018-01-01

## 2018-01-01 RX ORDER — BENZONATATE 200 MG/1
200 CAPSULE ORAL
Qty: 21 CAP | Refills: 0 | Status: SHIPPED | OUTPATIENT
Start: 2018-01-01 | End: 2018-01-01

## 2018-01-01 RX ORDER — SODIUM CHLORIDE 0.9 % (FLUSH) 0.9 %
5-10 SYRINGE (ML) INJECTION AS NEEDED
Status: DISCONTINUED | OUTPATIENT
Start: 2018-01-01 | End: 2018-01-01 | Stop reason: HOSPADM

## 2018-01-01 RX ORDER — CARVEDILOL 6.25 MG/1
6.25 TABLET ORAL 2 TIMES DAILY WITH MEALS
Status: DISCONTINUED | OUTPATIENT
Start: 2018-01-01 | End: 2018-01-01 | Stop reason: HOSPADM

## 2018-01-01 RX ORDER — INSULIN GLARGINE 100 [IU]/ML
40 INJECTION, SOLUTION SUBCUTANEOUS
Status: DISCONTINUED | OUTPATIENT
Start: 2018-01-01 | End: 2018-01-01

## 2018-01-01 RX ORDER — IPRATROPIUM BROMIDE AND ALBUTEROL SULFATE 2.5; .5 MG/3ML; MG/3ML
3 SOLUTION RESPIRATORY (INHALATION)
Status: DISCONTINUED | OUTPATIENT
Start: 2018-01-01 | End: 2018-01-01

## 2018-01-01 RX ORDER — IPRATROPIUM BROMIDE AND ALBUTEROL SULFATE 2.5; .5 MG/3ML; MG/3ML
3 SOLUTION RESPIRATORY (INHALATION)
Status: DISCONTINUED | OUTPATIENT
Start: 2018-01-01 | End: 2018-01-01 | Stop reason: HOSPADM

## 2018-01-01 RX ORDER — ATORVASTATIN CALCIUM 40 MG/1
80 TABLET, FILM COATED ORAL
Status: DISCONTINUED | OUTPATIENT
Start: 2018-01-01 | End: 2018-01-01 | Stop reason: HOSPADM

## 2018-01-01 RX ORDER — INSULIN GLARGINE 100 [IU]/ML
45 INJECTION, SOLUTION SUBCUTANEOUS
Qty: 1 VIAL | Refills: 0 | Status: SHIPPED | OUTPATIENT
Start: 2018-01-01

## 2018-01-01 RX ORDER — INSULIN GLARGINE 100 [IU]/ML
50 INJECTION, SOLUTION SUBCUTANEOUS
Status: DISCONTINUED | OUTPATIENT
Start: 2018-01-01 | End: 2018-01-01 | Stop reason: HOSPADM

## 2018-01-01 RX ORDER — HYDROCHLOROTHIAZIDE 25 MG/1
25 TABLET ORAL DAILY
Status: DISCONTINUED | OUTPATIENT
Start: 2018-01-01 | End: 2018-01-01 | Stop reason: HOSPADM

## 2018-01-01 RX ORDER — SODIUM CHLORIDE 0.9 % (FLUSH) 0.9 %
5-10 SYRINGE (ML) INJECTION EVERY 8 HOURS
Status: DISCONTINUED | OUTPATIENT
Start: 2018-01-01 | End: 2018-01-01 | Stop reason: HOSPADM

## 2018-01-01 RX ORDER — ONDANSETRON 2 MG/ML
4 INJECTION INTRAMUSCULAR; INTRAVENOUS
Status: DISCONTINUED | OUTPATIENT
Start: 2018-01-01 | End: 2018-01-01

## 2018-01-01 RX ORDER — TRAMADOL HYDROCHLORIDE 50 MG/1
50-100 TABLET ORAL
Qty: 20 TAB | Refills: 0 | Status: SHIPPED | OUTPATIENT
Start: 2018-01-01 | End: 2018-01-01

## 2018-01-01 RX ORDER — BUDESONIDE 0.5 MG/2ML
500 INHALANT ORAL
Status: DISCONTINUED | OUTPATIENT
Start: 2018-01-01 | End: 2018-01-01 | Stop reason: HOSPADM

## 2018-01-01 RX ORDER — MORPHINE SULFATE 2 MG/ML
4 INJECTION, SOLUTION INTRAMUSCULAR; INTRAVENOUS ONCE
Status: COMPLETED | OUTPATIENT
Start: 2018-01-01 | End: 2018-01-01

## 2018-01-01 RX ADMIN — IPRATROPIUM BROMIDE AND ALBUTEROL SULFATE 3 ML: .5; 3 SOLUTION RESPIRATORY (INHALATION) at 02:43

## 2018-01-01 RX ADMIN — BENAZEPRIL HYDROCHLORIDE 40 MG: 10 TABLET, FILM COATED ORAL at 08:06

## 2018-01-01 RX ADMIN — METHYLPREDNISOLONE SODIUM SUCCINATE 60 MG: 125 INJECTION, POWDER, FOR SOLUTION INTRAMUSCULAR; INTRAVENOUS at 23:06

## 2018-01-01 RX ADMIN — ENOXAPARIN SODIUM 40 MG: 40 INJECTION SUBCUTANEOUS at 23:16

## 2018-01-01 RX ADMIN — HYDROCHLOROTHIAZIDE 25 MG: 25 TABLET ORAL at 08:48

## 2018-01-01 RX ADMIN — OXYCODONE HYDROCHLORIDE 20 MG: 15 TABLET ORAL at 13:26

## 2018-01-01 RX ADMIN — INSULIN GLARGINE 35 UNITS: 100 INJECTION, SOLUTION SUBCUTANEOUS at 21:02

## 2018-01-01 RX ADMIN — Medication 10 ML: at 21:21

## 2018-01-01 RX ADMIN — INSULIN LISPRO 8 UNITS: 100 INJECTION, SOLUTION INTRAVENOUS; SUBCUTANEOUS at 21:20

## 2018-01-01 RX ADMIN — GUAIFENESIN 600 MG: 600 TABLET, EXTENDED RELEASE ORAL at 21:19

## 2018-01-01 RX ADMIN — CLOPIDOGREL BISULFATE 75 MG: 75 TABLET ORAL at 08:51

## 2018-01-01 RX ADMIN — INSULIN LISPRO 3 UNITS: 100 INJECTION, SOLUTION INTRAVENOUS; SUBCUTANEOUS at 08:53

## 2018-01-01 RX ADMIN — GUAIFENESIN 600 MG: 600 TABLET, EXTENDED RELEASE ORAL at 08:50

## 2018-01-01 RX ADMIN — Medication 10 ML: at 23:15

## 2018-01-01 RX ADMIN — CARVEDILOL 6.25 MG: 6.25 TABLET, FILM COATED ORAL at 08:51

## 2018-01-01 RX ADMIN — IPRATROPIUM BROMIDE AND ALBUTEROL SULFATE 3 ML: .5; 3 SOLUTION RESPIRATORY (INHALATION) at 20:04

## 2018-01-01 RX ADMIN — INSULIN LISPRO 15 UNITS: 100 INJECTION, SOLUTION INTRAVENOUS; SUBCUTANEOUS at 01:06

## 2018-01-01 RX ADMIN — BENAZEPRIL HYDROCHLORIDE 40 MG: 10 TABLET, FILM COATED ORAL at 08:53

## 2018-01-01 RX ADMIN — ONDANSETRON 4 MG: 2 INJECTION INTRAMUSCULAR; INTRAVENOUS at 18:12

## 2018-01-01 RX ADMIN — ONDANSETRON 4 MG: 2 INJECTION INTRAMUSCULAR; INTRAVENOUS at 12:00

## 2018-01-01 RX ADMIN — ASPIRIN 81 MG: 81 TABLET, COATED ORAL at 08:05

## 2018-01-01 RX ADMIN — IPRATROPIUM BROMIDE AND ALBUTEROL SULFATE 3 ML: .5; 3 SOLUTION RESPIRATORY (INHALATION) at 07:18

## 2018-01-01 RX ADMIN — OXYCODONE HYDROCHLORIDE 20 MG: 15 TABLET ORAL at 10:36

## 2018-01-01 RX ADMIN — CLOPIDOGREL BISULFATE 75 MG: 75 TABLET ORAL at 08:06

## 2018-01-01 RX ADMIN — BUDESONIDE 500 MCG: 0.5 INHALANT RESPIRATORY (INHALATION) at 19:42

## 2018-01-01 RX ADMIN — HYDROCHLOROTHIAZIDE 25 MG: 25 TABLET ORAL at 08:53

## 2018-01-01 RX ADMIN — Medication 10 ML: at 21:43

## 2018-01-01 RX ADMIN — Medication 10 ML: at 21:42

## 2018-01-01 RX ADMIN — OXYCODONE HYDROCHLORIDE 20 MG: 15 TABLET ORAL at 23:06

## 2018-01-01 RX ADMIN — BUDESONIDE 500 MCG: 0.5 INHALANT RESPIRATORY (INHALATION) at 07:18

## 2018-01-01 RX ADMIN — PREDNISONE 40 MG: 20 TABLET ORAL at 08:54

## 2018-01-01 RX ADMIN — IPRATROPIUM BROMIDE AND ALBUTEROL SULFATE 3 ML: .5; 3 SOLUTION RESPIRATORY (INHALATION) at 11:05

## 2018-01-01 RX ADMIN — CARVEDILOL 6.25 MG: 6.25 TABLET, FILM COATED ORAL at 08:58

## 2018-01-01 RX ADMIN — TUBERCULIN PURIFIED PROTEIN DERIVATIVE 5 UNITS: 5 INJECTION, SOLUTION INTRADERMAL at 00:40

## 2018-01-01 RX ADMIN — INSULIN LISPRO 4 UNITS: 100 INJECTION, SOLUTION INTRAVENOUS; SUBCUTANEOUS at 17:21

## 2018-01-01 RX ADMIN — Medication 10 ML: at 17:02

## 2018-01-01 RX ADMIN — CLOPIDOGREL BISULFATE 75 MG: 75 TABLET ORAL at 08:59

## 2018-01-01 RX ADMIN — CARVEDILOL 6.25 MG: 6.25 TABLET, FILM COATED ORAL at 16:30

## 2018-01-01 RX ADMIN — ENOXAPARIN SODIUM 40 MG: 40 INJECTION SUBCUTANEOUS at 21:43

## 2018-01-01 RX ADMIN — INSULIN LISPRO 3 UNITS: 100 INJECTION, SOLUTION INTRAVENOUS; SUBCUTANEOUS at 12:11

## 2018-01-01 RX ADMIN — ASPIRIN 81 MG: 81 TABLET, COATED ORAL at 08:54

## 2018-01-01 RX ADMIN — PREDNISONE 40 MG: 20 TABLET ORAL at 08:58

## 2018-01-01 RX ADMIN — ASPIRIN 81 MG: 81 TABLET, COATED ORAL at 08:59

## 2018-01-01 RX ADMIN — HYDROCHLOROTHIAZIDE 25 MG: 25 TABLET ORAL at 08:51

## 2018-01-01 RX ADMIN — IPRATROPIUM BROMIDE AND ALBUTEROL SULFATE 3 ML: .5; 3 SOLUTION RESPIRATORY (INHALATION) at 08:23

## 2018-01-01 RX ADMIN — GUAIFENESIN 600 MG: 600 TABLET, EXTENDED RELEASE ORAL at 08:54

## 2018-01-01 RX ADMIN — Medication 10 ML: at 05:27

## 2018-01-01 RX ADMIN — OXYCODONE HYDROCHLORIDE 20 MG: 15 TABLET ORAL at 18:12

## 2018-01-01 RX ADMIN — IPRATROPIUM BROMIDE AND ALBUTEROL SULFATE 3 ML: .5; 3 SOLUTION RESPIRATORY (INHALATION) at 15:51

## 2018-01-01 RX ADMIN — INSULIN LISPRO 3 UNITS: 100 INJECTION, SOLUTION INTRAVENOUS; SUBCUTANEOUS at 08:56

## 2018-01-01 RX ADMIN — HYDROCHLOROTHIAZIDE 25 MG: 25 TABLET ORAL at 08:58

## 2018-01-01 RX ADMIN — INSULIN LISPRO 8 UNITS: 100 INJECTION, SOLUTION INTRAVENOUS; SUBCUTANEOUS at 16:31

## 2018-01-01 RX ADMIN — INSULIN LISPRO 3 UNITS: 100 INJECTION, SOLUTION INTRAVENOUS; SUBCUTANEOUS at 17:21

## 2018-01-01 RX ADMIN — METHYLPREDNISOLONE SODIUM SUCCINATE 60 MG: 125 INJECTION, POWDER, FOR SOLUTION INTRAMUSCULAR; INTRAVENOUS at 03:28

## 2018-01-01 RX ADMIN — BUDESONIDE 500 MCG: 0.5 INHALANT RESPIRATORY (INHALATION) at 19:31

## 2018-01-01 RX ADMIN — Medication 10 ML: at 00:45

## 2018-01-01 RX ADMIN — CARVEDILOL 6.25 MG: 6.25 TABLET, FILM COATED ORAL at 08:54

## 2018-01-01 RX ADMIN — INSULIN GLARGINE 50 UNITS: 100 INJECTION, SOLUTION SUBCUTANEOUS at 21:43

## 2018-01-01 RX ADMIN — ASPIRIN 81 MG: 81 TABLET, COATED ORAL at 08:51

## 2018-01-01 RX ADMIN — INSULIN LISPRO 8 UNITS: 100 INJECTION, SOLUTION INTRAVENOUS; SUBCUTANEOUS at 12:44

## 2018-01-01 RX ADMIN — OXYCODONE HYDROCHLORIDE 20 MG: 15 TABLET ORAL at 09:29

## 2018-01-01 RX ADMIN — IPRATROPIUM BROMIDE AND ALBUTEROL SULFATE 3 ML: .5; 3 SOLUTION RESPIRATORY (INHALATION) at 01:17

## 2018-01-01 RX ADMIN — BUDESONIDE 500 MCG: 0.5 INHALANT RESPIRATORY (INHALATION) at 07:51

## 2018-01-01 RX ADMIN — CARVEDILOL 6.25 MG: 6.25 TABLET, FILM COATED ORAL at 17:03

## 2018-01-01 RX ADMIN — ATORVASTATIN CALCIUM 80 MG: 40 TABLET, FILM COATED ORAL at 21:19

## 2018-01-01 RX ADMIN — IPRATROPIUM BROMIDE AND ALBUTEROL SULFATE 3 ML: .5; 3 SOLUTION RESPIRATORY (INHALATION) at 20:40

## 2018-01-01 RX ADMIN — HYDROCHLOROTHIAZIDE 25 MG: 25 TABLET ORAL at 08:06

## 2018-01-01 RX ADMIN — INSULIN LISPRO 8 UNITS: 100 INJECTION, SOLUTION INTRAVENOUS; SUBCUTANEOUS at 12:17

## 2018-01-01 RX ADMIN — CARVEDILOL 6.25 MG: 6.25 TABLET, FILM COATED ORAL at 17:16

## 2018-01-01 RX ADMIN — IPRATROPIUM BROMIDE AND ALBUTEROL SULFATE 3 ML: .5; 3 SOLUTION RESPIRATORY (INHALATION) at 11:09

## 2018-01-01 RX ADMIN — OXYCODONE HYDROCHLORIDE 20 MG: 15 TABLET ORAL at 17:03

## 2018-01-01 RX ADMIN — BENAZEPRIL HYDROCHLORIDE 40 MG: 10 TABLET, FILM COATED ORAL at 08:59

## 2018-01-01 RX ADMIN — IPRATROPIUM BROMIDE AND ALBUTEROL SULFATE 3 ML: .5; 3 SOLUTION RESPIRATORY (INHALATION) at 04:28

## 2018-01-01 RX ADMIN — ATORVASTATIN CALCIUM 80 MG: 40 TABLET, FILM COATED ORAL at 23:15

## 2018-01-01 RX ADMIN — ATORVASTATIN CALCIUM 80 MG: 40 TABLET, FILM COATED ORAL at 21:41

## 2018-01-01 RX ADMIN — METHYLPREDNISOLONE SODIUM SUCCINATE 60 MG: 125 INJECTION, POWDER, FOR SOLUTION INTRAMUSCULAR; INTRAVENOUS at 08:04

## 2018-01-01 RX ADMIN — INSULIN LISPRO 6 UNITS: 100 INJECTION, SOLUTION INTRAVENOUS; SUBCUTANEOUS at 18:14

## 2018-01-01 RX ADMIN — IPRATROPIUM BROMIDE AND ALBUTEROL SULFATE 3 ML: .5; 3 SOLUTION RESPIRATORY (INHALATION) at 04:34

## 2018-01-01 RX ADMIN — INSULIN LISPRO 3 UNITS: 100 INJECTION, SOLUTION INTRAVENOUS; SUBCUTANEOUS at 16:31

## 2018-01-01 RX ADMIN — INSULIN GLARGINE 45 UNITS: 100 INJECTION, SOLUTION SUBCUTANEOUS at 21:42

## 2018-01-01 RX ADMIN — GUAIFENESIN 600 MG: 600 TABLET, EXTENDED RELEASE ORAL at 08:59

## 2018-01-01 RX ADMIN — INSULIN LISPRO 6 UNITS: 100 INJECTION, SOLUTION INTRAVENOUS; SUBCUTANEOUS at 09:28

## 2018-01-01 RX ADMIN — IPRATROPIUM BROMIDE AND ALBUTEROL SULFATE 3 ML: .5; 3 SOLUTION RESPIRATORY (INHALATION) at 19:42

## 2018-01-01 RX ADMIN — PANTOPRAZOLE SODIUM 40 MG: 40 TABLET, DELAYED RELEASE ORAL at 05:27

## 2018-01-01 RX ADMIN — INSULIN LISPRO 8 UNITS: 100 INJECTION, SOLUTION INTRAVENOUS; SUBCUTANEOUS at 11:42

## 2018-01-01 RX ADMIN — ATORVASTATIN CALCIUM 80 MG: 40 TABLET, FILM COATED ORAL at 21:02

## 2018-01-01 RX ADMIN — BUDESONIDE 500 MCG: 0.5 INHALANT RESPIRATORY (INHALATION) at 07:54

## 2018-01-01 RX ADMIN — BENAZEPRIL HYDROCHLORIDE 40 MG: 10 TABLET, FILM COATED ORAL at 08:51

## 2018-01-01 RX ADMIN — INSULIN LISPRO 20 UNITS: 100 INJECTION, SOLUTION INTRAVENOUS; SUBCUTANEOUS at 20:37

## 2018-01-01 RX ADMIN — CLOPIDOGREL BISULFATE 75 MG: 75 TABLET ORAL at 08:54

## 2018-01-01 RX ADMIN — IPRATROPIUM BROMIDE AND ALBUTEROL SULFATE 3 ML: .5; 3 SOLUTION RESPIRATORY (INHALATION) at 13:41

## 2018-01-01 RX ADMIN — BUDESONIDE 500 MCG: 0.5 INHALANT RESPIRATORY (INHALATION) at 20:04

## 2018-01-01 RX ADMIN — INSULIN LISPRO 6 UNITS: 100 INJECTION, SOLUTION INTRAVENOUS; SUBCUTANEOUS at 08:52

## 2018-01-01 RX ADMIN — Medication 10 ML: at 05:53

## 2018-01-01 RX ADMIN — OXYCODONE HYDROCHLORIDE 20 MG: 15 TABLET ORAL at 23:02

## 2018-01-01 RX ADMIN — ENOXAPARIN SODIUM 40 MG: 40 INJECTION SUBCUTANEOUS at 21:19

## 2018-01-01 RX ADMIN — CARVEDILOL 6.25 MG: 6.25 TABLET, FILM COATED ORAL at 18:13

## 2018-01-01 RX ADMIN — ATORVASTATIN CALCIUM 80 MG: 40 TABLET, FILM COATED ORAL at 21:43

## 2018-01-01 RX ADMIN — GUAIFENESIN 600 MG: 600 TABLET, EXTENDED RELEASE ORAL at 08:48

## 2018-01-01 RX ADMIN — INSULIN LISPRO 3 UNITS: 100 INJECTION, SOLUTION INTRAVENOUS; SUBCUTANEOUS at 11:42

## 2018-01-01 RX ADMIN — INSULIN GLARGINE 30 UNITS: 100 INJECTION, SOLUTION SUBCUTANEOUS at 23:17

## 2018-01-01 RX ADMIN — OXYCODONE HYDROCHLORIDE 20 MG: 15 TABLET ORAL at 06:38

## 2018-01-01 RX ADMIN — PREDNISONE 40 MG: 20 TABLET ORAL at 08:51

## 2018-01-01 RX ADMIN — ENOXAPARIN SODIUM 40 MG: 40 INJECTION SUBCUTANEOUS at 21:02

## 2018-01-01 RX ADMIN — BUDESONIDE 500 MCG: 0.5 INHALANT RESPIRATORY (INHALATION) at 20:40

## 2018-01-01 RX ADMIN — Medication 10 ML: at 05:14

## 2018-01-01 RX ADMIN — Medication 10 ML: at 14:00

## 2018-01-01 RX ADMIN — OXYCODONE HYDROCHLORIDE 20 MG: 15 TABLET ORAL at 23:22

## 2018-01-01 RX ADMIN — INSULIN LISPRO 3 UNITS: 100 INJECTION, SOLUTION INTRAVENOUS; SUBCUTANEOUS at 18:15

## 2018-01-01 RX ADMIN — OXYCODONE HYDROCHLORIDE 20 MG: 15 TABLET ORAL at 05:27

## 2018-01-01 RX ADMIN — PANTOPRAZOLE SODIUM 40 MG: 40 TABLET, DELAYED RELEASE ORAL at 05:13

## 2018-01-01 RX ADMIN — BENAZEPRIL HYDROCHLORIDE 40 MG: 10 TABLET, FILM COATED ORAL at 08:47

## 2018-01-01 RX ADMIN — CARVEDILOL 6.25 MG: 6.25 TABLET, FILM COATED ORAL at 08:48

## 2018-01-01 RX ADMIN — IPRATROPIUM BROMIDE AND ALBUTEROL SULFATE 3 ML: .5; 3 SOLUTION RESPIRATORY (INHALATION) at 11:40

## 2018-01-01 RX ADMIN — IPRATROPIUM BROMIDE AND ALBUTEROL SULFATE 3 ML: .5; 3 SOLUTION RESPIRATORY (INHALATION) at 07:50

## 2018-01-01 RX ADMIN — INSULIN LISPRO 4 UNITS: 100 INJECTION, SOLUTION INTRAVENOUS; SUBCUTANEOUS at 08:44

## 2018-01-01 RX ADMIN — ASPIRIN 81 MG: 81 TABLET, COATED ORAL at 08:48

## 2018-01-01 RX ADMIN — IPRATROPIUM BROMIDE AND ALBUTEROL SULFATE 3 ML: .5; 3 SOLUTION RESPIRATORY (INHALATION) at 16:00

## 2018-01-01 RX ADMIN — BUDESONIDE 500 MCG: 0.5 INHALANT RESPIRATORY (INHALATION) at 08:00

## 2018-01-01 RX ADMIN — IPRATROPIUM BROMIDE AND ALBUTEROL SULFATE 3 ML: .5; 3 SOLUTION RESPIRATORY (INHALATION) at 08:00

## 2018-01-01 RX ADMIN — IPRATROPIUM BROMIDE AND ALBUTEROL SULFATE 3 ML: .5; 3 SOLUTION RESPIRATORY (INHALATION) at 20:02

## 2018-01-01 RX ADMIN — MORPHINE SULFATE 4 MG: 2 INJECTION, SOLUTION INTRAMUSCULAR; INTRAVENOUS at 15:17

## 2018-01-01 RX ADMIN — PANTOPRAZOLE SODIUM 40 MG: 40 TABLET, DELAYED RELEASE ORAL at 05:53

## 2018-01-01 RX ADMIN — IPRATROPIUM BROMIDE AND ALBUTEROL SULFATE 3 ML: .5; 3 SOLUTION RESPIRATORY (INHALATION) at 15:05

## 2018-01-01 RX ADMIN — GUAIFENESIN 600 MG: 600 TABLET, EXTENDED RELEASE ORAL at 21:02

## 2018-01-01 RX ADMIN — OXYCODONE HYDROCHLORIDE 20 MG: 15 TABLET ORAL at 08:58

## 2018-01-01 RX ADMIN — INSULIN LISPRO 6 UNITS: 100 INJECTION, SOLUTION INTRAVENOUS; SUBCUTANEOUS at 23:16

## 2018-01-01 RX ADMIN — INSULIN LISPRO 10 UNITS: 100 INJECTION, SOLUTION INTRAVENOUS; SUBCUTANEOUS at 17:02

## 2018-01-01 RX ADMIN — INSULIN LISPRO 3 UNITS: 100 INJECTION, SOLUTION INTRAVENOUS; SUBCUTANEOUS at 08:45

## 2018-01-01 RX ADMIN — IPRATROPIUM BROMIDE AND ALBUTEROL SULFATE 3 ML: .5; 3 SOLUTION RESPIRATORY (INHALATION) at 07:54

## 2018-01-01 RX ADMIN — GUAIFENESIN 600 MG: 600 TABLET, EXTENDED RELEASE ORAL at 21:41

## 2018-01-01 RX ADMIN — IPRATROPIUM BROMIDE AND ALBUTEROL SULFATE 3 ML: .5; 3 SOLUTION RESPIRATORY (INHALATION) at 19:31

## 2018-01-01 RX ADMIN — BUDESONIDE 500 MCG: 0.5 INHALANT RESPIRATORY (INHALATION) at 20:02

## 2018-01-01 RX ADMIN — BUDESONIDE 500 MCG: 0.5 INHALANT RESPIRATORY (INHALATION) at 08:23

## 2018-01-01 RX ADMIN — OXYCODONE HYDROCHLORIDE 20 MG: 15 TABLET ORAL at 02:13

## 2018-01-01 RX ADMIN — OXYCODONE HYDROCHLORIDE 20 MG: 15 TABLET ORAL at 12:00

## 2018-01-01 RX ADMIN — INSULIN LISPRO 8 UNITS: 100 INJECTION, SOLUTION INTRAVENOUS; SUBCUTANEOUS at 20:41

## 2018-01-01 RX ADMIN — OXYCODONE HYDROCHLORIDE 20 MG: 15 TABLET ORAL at 19:21

## 2018-01-01 RX ADMIN — CLOPIDOGREL BISULFATE 75 MG: 75 TABLET ORAL at 08:48

## 2018-01-01 RX ADMIN — GUAIFENESIN 600 MG: 600 TABLET, EXTENDED RELEASE ORAL at 21:43

## 2018-01-01 RX ADMIN — INSULIN LISPRO 20 UNITS: 100 INJECTION, SOLUTION INTRAVENOUS; SUBCUTANEOUS at 21:02

## 2018-01-01 RX ADMIN — INSULIN LISPRO 6 UNITS: 100 INJECTION, SOLUTION INTRAVENOUS; SUBCUTANEOUS at 12:11

## 2018-01-01 RX ADMIN — IPRATROPIUM BROMIDE AND ALBUTEROL SULFATE 3 ML: .5; 3 SOLUTION RESPIRATORY (INHALATION) at 23:20

## 2018-01-01 RX ADMIN — INSULIN LISPRO 6 UNITS: 100 INJECTION, SOLUTION INTRAVENOUS; SUBCUTANEOUS at 08:56

## 2018-01-01 RX ADMIN — IPRATROPIUM BROMIDE AND ALBUTEROL SULFATE 3 ML: .5; 3 SOLUTION RESPIRATORY (INHALATION) at 04:15

## 2018-01-01 RX ADMIN — IPRATROPIUM BROMIDE AND ALBUTEROL SULFATE 3 ML: .5; 3 SOLUTION RESPIRATORY (INHALATION) at 23:26

## 2018-01-01 RX ADMIN — CARVEDILOL 6.25 MG: 6.25 TABLET, FILM COATED ORAL at 08:06

## 2018-01-01 RX ADMIN — IPRATROPIUM BROMIDE AND ALBUTEROL SULFATE 3 ML: .5; 3 SOLUTION RESPIRATORY (INHALATION) at 23:21

## 2018-01-01 RX ADMIN — INSULIN GLARGINE 40 UNITS: 100 INJECTION, SOLUTION SUBCUTANEOUS at 21:20

## 2018-01-01 RX ADMIN — Medication 10 ML: at 06:00

## 2018-01-01 RX ADMIN — INSULIN LISPRO 8 UNITS: 100 INJECTION, SOLUTION INTRAVENOUS; SUBCUTANEOUS at 08:48

## 2018-01-01 RX ADMIN — OXYCODONE HYDROCHLORIDE 20 MG: 15 TABLET ORAL at 17:16

## 2018-01-01 RX ADMIN — PANTOPRAZOLE SODIUM 40 MG: 40 TABLET, DELAYED RELEASE ORAL at 06:38

## 2018-01-01 RX ADMIN — OXYCODONE HYDROCHLORIDE 20 MG: 15 TABLET ORAL at 05:13

## 2018-01-01 RX ADMIN — INSULIN LISPRO 3 UNITS: 100 INJECTION, SOLUTION INTRAVENOUS; SUBCUTANEOUS at 12:18

## 2018-01-01 RX ADMIN — ENOXAPARIN SODIUM 40 MG: 40 INJECTION SUBCUTANEOUS at 21:41

## 2018-01-01 RX ADMIN — PANTOPRAZOLE SODIUM 40 MG: 40 TABLET, DELAYED RELEASE ORAL at 06:49

## 2018-01-01 RX ADMIN — Medication 10 ML: at 14:51

## 2018-01-01 RX ADMIN — METHYLPREDNISOLONE SODIUM SUCCINATE 60 MG: 125 INJECTION, POWDER, FOR SOLUTION INTRAMUSCULAR; INTRAVENOUS at 08:47

## 2018-01-01 RX ADMIN — INSULIN LISPRO 15 UNITS: 100 INJECTION, SOLUTION INTRAVENOUS; SUBCUTANEOUS at 21:42

## 2018-02-28 NOTE — ED NOTES
I have reviewed discharge instructions with the patient. The patient verbalized understanding. Patient left ED via Discharge Method: ambulatory to Home with self    Opportunity for questions and clarification provided. Patient given 4 scripts. To continue your aftercare when you leave the hospital, you may receive an automated call from our care team to check in on how you are doing. This is a free service and part of our promise to provide the best care and service to meet your aftercare needs.  If you have questions, or wish to unsubscribe from this service please call [phone]. Thank you for Choosing our [facility] Emergency Department.

## 2018-02-28 NOTE — DISCHARGE INSTRUCTIONS
Bronchitis: Care Instructions  Your Care Instructions    Bronchitis is inflammation of the bronchial tubes, which carry air to the lungs. The tubes swell and produce mucus, or phlegm. The mucus and inflamed bronchial tubes make you cough. You may have trouble breathing. Most cases of bronchitis are caused by viruses like those that cause colds. Antibiotics usually do not help and they may be harmful. Bronchitis usually develops rapidly and lasts about 2 to 3 weeks in otherwise healthy people. Follow-up care is a key part of your treatment and safety. Be sure to make and go to all appointments, and call your doctor if you are having problems. It's also a good idea to know your test results and keep a list of the medicines you take. How can you care for yourself at home? · Take all medicines exactly as prescribed. Call your doctor if you think you are having a problem with your medicine. · Get some extra rest.  · Take an over-the-counter pain medicine, such as acetaminophen (Tylenol), ibuprofen (Advil, Motrin), or naproxen (Aleve) to reduce fever and relieve body aches. Read and follow all instructions on the label. · Do not take two or more pain medicines at the same time unless the doctor told you to. Many pain medicines have acetaminophen, which is Tylenol. Too much acetaminophen (Tylenol) can be harmful. · Take an over-the-counter cough medicine that contains dextromethorphan to help quiet a dry, hacking cough so that you can sleep. Avoid cough medicines that have more than one active ingredient. Read and follow all instructions on the label. · Breathe moist air from a humidifier, hot shower, or sink filled with hot water. The heat and moisture will thin mucus so you can cough it out. · Do not smoke. Smoking can make bronchitis worse. If you need help quitting, talk to your doctor about stop-smoking programs and medicines. These can increase your chances of quitting for good.   When should you call for help? Call 911 anytime you think you may need emergency care. For example, call if:  ? · You have severe trouble breathing. ?Call your doctor now or seek immediate medical care if:  ? · You have new or worse trouble breathing. ? · You cough up dark brown or bloody mucus (sputum). ? · You have a new or higher fever. ? · You have a new rash. ? Watch closely for changes in your health, and be sure to contact your doctor if:  ? · You cough more deeply or more often, especially if you notice more mucus or a change in the color of your mucus. ? · You are not getting better as expected. Where can you learn more? Go to http://april-karla.info/. Enter H333 in the search box to learn more about \"Bronchitis: Care Instructions. \"  Current as of: May 12, 2017  Content Version: 11.4  © 6057-0811 [email or web address]. Care instructions adapted under license by [employer] (which disclaims liability or warranty for this information). If you have questions about a medical condition or this instruction, always ask your healthcare professional. [place] [address] any warranty or liability for your use of this information. Chest Pain: Care Instructions  Your Care Instructions    There are many things that can cause chest pain. Some are not serious and will get better on their own in a few days. But some kinds of chest pain need more testing and treatment. Your doctor may have recommended a follow-up visit in the next 8 to 12 hours. If you are not getting better, you may need more tests or treatment. Even though your doctor has released you, you still need to watch for any problems. The doctor carefully checked you, but sometimes problems can develop later. If you have new symptoms or if your symptoms do not get better, get medical care right away.   If you have worse or different chest pain or pressure that lasts more than 5 minutes or you passed out (lost consciousness), call 911 or seek other emergency help right away. A medical visit is only one step in your treatment. Even if you feel better, you still need to do what your doctor recommends, such as going to all suggested follow-up appointments and taking medicines exactly as directed. This will help you recover and help prevent future problems. How can you care for yourself at home? · Rest until you feel better. · Take your medicine exactly as prescribed. Call your doctor if you think you are having a problem with your medicine. · Do not drive after taking a prescription pain medicine. When should you call for help? Call 911 if:  ? · You passed out (lost consciousness). ? · You have severe difficulty breathing. ? · You have symptoms of a heart attack. These may include:  ¨ Chest pain or pressure, or a strange feeling in your chest.  ¨ Sweating. ¨ Shortness of breath. ¨ Nausea or vomiting. ¨ Pain, pressure, or a strange feeling in your back, neck, jaw, or upper belly or in one or both shoulders or arms. ¨ Lightheadedness or sudden weakness. ¨ A fast or irregular heartbeat. After you call 911, the  may tell you to chew 1 adult-strength or 2 to 4 low-dose aspirin. Wait for an ambulance. Do not try to drive yourself. ?Call your doctor today if:  ? · You have any trouble breathing. ? · Your chest pain gets worse. ? · You are dizzy or lightheaded, or you feel like you may faint. ? · You are not getting better as expected. ? · You are having new or different chest pain. Where can you learn more? Go to http://april-karla.info/. Enter A120 in the search box to learn more about \"Chest Pain: Care Instructions. \"  Current as of: March 20, 2017  Content Version: 11.4  © 1451-0102 [employer]. Care instructions adapted under license by [employer] (which disclaims liability or warranty for this information).  If you have questions about a medical condition or this instruction, always ask your healthcare professional. [address] any warranty or liability for your use of this information. Musculoskeletal Chest Pain: Care Instructions  Your Care Instructions    Chest pain is not always a sign that something is wrong with your heart or that you have another serious problem. The doctor thinks your chest pain is caused by strained muscles or ligaments, inflamed chest cartilage, or another problem in your chest, rather than by your heart. You may need more tests to find the cause of your chest pain. Follow-up care is a key part of your treatment and safety. Be sure to make and go to all appointments, and call your doctor if you are having problems. It's also a good idea to know your test results and keep a list of the medicines you take. How can you care for yourself at home? · Take pain medicines exactly as directed. ¨ If the doctor gave you a prescription medicine for pain, take it as prescribed. ¨ If you are not taking a prescription pain medicine, ask your doctor if you can take an over-the-counter medicine. · Rest and protect the sore area. · Stop, change, or take a break from any activity that may be causing your pain or soreness. · Put ice or a cold pack on the sore area for 10 to 20 minutes at a time. Try to do this every 1 to 2 hours for the next 3 days (when you are awake) or until the swelling goes down. Put a thin cloth between the ice and your skin. · After 2 or 3 days, apply a heating pad set on low or a warm cloth to the area that hurts. Some doctors suggest that you go back and forth between hot and cold. · Do not wrap or tape your ribs for support. This may cause you to take smaller breaths, which could increase your risk of lung problems. · Mentholated creams such as Bengay or Icy Hot may soothe sore muscles. Follow the instructions on the package.   · Follow your doctor's instructions for exercising. · Gentle stretching and massage may help you get better faster. Stretch slowly to the point just before pain begins, and hold the stretch for at least 15 to 30 seconds. Do this 3 or 4 times a day. Stretch just after you have applied heat. · As your pain gets better, slowly return to your normal activities. Any increased pain may be a sign that you need to rest a while longer. When should you call for help? Call 911 anytime you think you may need emergency care. For example, call if:  ? · You have chest pain or pressure. This may occur with:  ¨ Sweating. ¨ Shortness of breath. ¨ Nausea or vomiting. ¨ Pain that spreads from the chest to the neck, jaw, or one or both shoulders or arms. ¨ Dizziness or lightheadedness. ¨ A fast or uneven pulse. After calling 911, chew 1 adult-strength aspirin. Wait for an ambulance. Do not try to drive yourself. ? · You have sudden chest pain and shortness of breath, or you cough up blood. ?Call your doctor now or seek immediate medical care if:  ? · You have any trouble breathing. ? · Your chest pain gets worse. ? · Your chest pain occurs consistently with exercise and is relieved by rest.   ? Watch closely for changes in your health, and be sure to contact your doctor if:  ? · Your chest pain does not get better after 1 week. Where can you learn more? Go to http://april-karla.info/. Enter V293 in the search box to learn more about \"Musculoskeletal Chest Pain: Care Instructions. \"  Current as of: March 20, 2017  Content Version: 11.4  © 2603-8476 [employer]. Care instructions adapted under license by [employer] (which disclaims liability or warranty for this information). If you have questions about a medical condition or this instruction, always ask your healthcare professional. [address] any warranty or liability for your use of this information.

## 2018-02-28 NOTE — ED TRIAGE NOTES
Pt states having chest pain for the past 4 days. Pt has also had a cough for the past 4 days. Pt states having clear sputum with the cough. Pt describes the chest pain as an aching throbbing pain. Pt states when he lays back he coughs and that makes it worse.

## 2018-03-04 NOTE — ED PROVIDER NOTES
HPI Comments: `sent from office for troponins,  Had cxr at office, report sent and is clear    Patient is a 79 y.o. male presenting with chest pain. The history is provided by the patient and the spouse. Chest Pain    This is a new problem. The current episode started more than 2 days ago. The problem has not changed since onset. The problem occurs constantly. The pain is associated with normal activity and coughing. The pain is present in the substernal region. The pain is mild. The quality of the pain is described as sharp. The pain does not radiate. The symptoms are aggravated by deep breathing, palpation and certain positions. Associated symptoms include cough. Pertinent negatives include no abdominal pain, no back pain, no diaphoresis, no dizziness, no fever, no headaches, no irregular heartbeat, no nausea, no near-syncope, no palpitations, no shortness of breath and no vomiting. His past medical history is significant for DM and HTN. His past medical history does not include DVT or PE. Procedural history includes cardiac catheterization and cardiac stents. Past Medical History:   Diagnosis Date    Arthritis     CAD (coronary artery disease)     [facility] [address]     Diabetes ([facility] [address]     GERD (gastroesophageal reflux disease)     Hypertension     Psychiatric disorder        Past Surgical History:   Procedure Laterality Date    CARDIAC SURG PROCEDURE UNLIST      PCI LAD    HX CATARACT REMOVAL Bilateral 2017    HX ORTHOPAEDIC      KNEE X 4         Family History:   Problem Relation Age of Onset    Hypertension Mother     Stroke Maternal Grandfather     Heart Disease Paternal Grandfather        Social History     Social History    Marital status:      Spouse name: N/A    Number of children: N/A    Years of education: N/A     Occupational History    Not on file.      Social History Main Topics    Smoking status: Former Smoker     Quit date: 2017    Smokeless tobacco: Never Used      Comment: cigars - 1 per month    Alcohol use No    Drug use: No    Sexual activity: Not Currently     Other Topics Concern    Not on file     Social History Narrative         ALLERGIES: Contrast dye [iodine] and Pineapple    Review of Systems   Constitutional: Negative for chills, diaphoresis and fever. HENT: Negative for rhinorrhea and sore throat. Eyes: Negative for discharge and redness. Respiratory: Positive for cough. Negative for shortness of breath. Cardiovascular: Positive for chest pain. Negative for palpitations and near-syncope. Gastrointestinal: Negative for abdominal pain, diarrhea, nausea and vomiting. Musculoskeletal: Negative for arthralgias and back pain. Skin: Negative for rash. Neurological: Negative for dizziness and headaches. All other systems reviewed and are negative. Vitals:    02/28/18 1024 02/28/18 1615 02/28/18 1615   BP: 130/75  142/78   Pulse: 94  72   Resp: 18  18   SpO2: 94% 98% 98%   Weight: 127 kg (280 lb)     Height: 6' 4\" (1.93 m)              Physical Exam   Constitutional: He is oriented to person, place, and time. He appears well-developed and well-nourished. No distress. HENT:   Head: Normocephalic and atraumatic. Eyes: Conjunctivae are normal. Pupils are equal, round, and reactive to light. Right eye exhibits no discharge. Left eye exhibits no discharge. No scleral icterus. Neck: Normal range of motion. Neck supple. Cardiovascular: Normal rate, regular rhythm and normal heart sounds. Exam reveals no gallop. No murmur heard. Pulmonary/Chest: Effort normal and breath sounds normal. No respiratory distress. He has no wheezes. He has no rales. He exhibits tenderness. Abdominal: Soft. Bowel sounds are normal. There is no tenderness. There is no guarding. Musculoskeletal: Normal range of motion. He exhibits no edema. Neurological: He is alert and oriented to person, place, and time.  He exhibits normal muscle tone.   cni 2-12 grossly   Skin: Skin is warm and dry. He is not diaphoretic. Psychiatric: He has a normal mood and affect. His behavior is normal.   Nursing note and vitals reviewed. MDM  Number of Diagnoses or Management Options  Acute bronchitis, unspecified organism:   Atypical chest pain:   Diagnosis management comments: Medical decision making note:  Cough with chest pain, has h/o cad  ekg and tropoin ok  Treat for bronchitis  This concludes the \"medical decision making note\" part of this emergency department visit note.           ED Course       Procedures

## 2018-03-06 PROBLEM — I44.1 MOBITZ I: Status: ACTIVE | Noted: 2018-01-01

## 2018-03-06 PROBLEM — G93.40 ACUTE ENCEPHALOPATHY: Status: ACTIVE | Noted: 2018-01-01

## 2018-03-06 PROBLEM — J96.01 ACUTE RESPIRATORY FAILURE WITH HYPOXIA (HCC): Status: ACTIVE | Noted: 2018-01-01

## 2018-03-06 PROBLEM — J44.1 COPD EXACERBATION (HCC): Status: ACTIVE | Noted: 2018-01-01

## 2018-03-06 NOTE — IP AVS SNAPSHOT
303 [facility] 
 
 
 [address] 
[phone] Patient: Pippa Finney MRN: [id number] About your hospitalization You were admitted on:  March 6, 2018 You last received care in the:  [facility] 6 MED SURG You were discharged on:  March 11, 2018 Why you were hospitalized Your primary diagnosis was:  Acute Respiratory Failure With Hypoxia (Hcc) Your diagnoses also included:  Copd With Acute Exacerbation (Hcc), Controlled Type 2 Diabetes Mellitus With Diabetic Polyneuropathy, With Long-Term Current Use Of Insulin (Hcc), Coronary Artery Disease Of Native Artery Of Native Heart With Stable Angina Pectoris (Hcc), Essential Hypertension, Mobitz I, Acute Encephalopathy Follow-up Information Follow up With Details Comments Contact Info Dima Naylor MD In 3 days Follow up with PCP 3-5 days after discharge 1 [facility] [address] 
[phone] [facility] 634 available to follow after discharge if needed. [phone] Hour Help Line Dima Naylor MD In 1 week  1 [facility] [address] 
[phone] Your Scheduled Appointments Thursday April 26, 2018  1:45 PM EDT Office Visit with Pramod Saldivar MD  
One [facility] ([address] [place] [address]) [address] [place] [address] 81  
[phone] Discharge Orders None A check patricia indicates which time of day the medication should be taken. My Medications START taking these medications Instructions Each Dose to Equal  
 Morning Noon Evening Bedtime  
 guaiFENesin  mg ER tablet Commonly known as:  Carlos & Carlos Your last dose was: This morning Your next dose is: This evening Take 1 Tab by mouth every twelve (12) hours. 600 mg CHANGE how you take these medications Instructions Each Dose to Equal  
 Morning Noon Evening Bedtime  
 insulin glargine 100 unit/mL injection Commonly known as:  LANTUS U-100 INSULIN What changed:  how much to take Your last dose was: Last night at bedtime Your next dose is: Tonight at bedtime 45 Units by SubCUTAneous route nightly. 45 Units CONTINUE taking these medications Instructions Each Dose to Equal  
 Morning Noon Evening Bedtime  
 aspirin delayed-release 81 mg tablet Your last dose was: This morning Your next dose is:  Tomorrow Morning Take  by mouth daily. atorvastatin 80 mg tablet Commonly known as:  LIPITOR Your last dose was: Last night Your next dose is: Tonight Take 1 Tab by mouth nightly. 80 mg  
    
   
   
   
  
  
 benazepril 40 mg tablet Commonly known as:  LOTENSIN Your last dose was: This morning Your next dose is:  Tomorrow Morning Take 1 Tab by mouth daily. 40 mg  
    
  
   
   
   
  
 carvedilol 6.25 mg tablet Commonly known as:  Jimmy Carmen Your last dose was: This morning Your next dose is: This evening with dinner Take  by mouth two (2) times daily (with meals). clopidogrel 75 mg Tab Commonly known as:  PLAVIX Your last dose was: This morning Your next dose is:  Tomorrow Morning Take 1 Tab by mouth daily. 75 mg HumaLOG U-100 Insulin 100 unit/mL injection Generic drug:  insulin lispro Your last dose was: This morning at 0845  
   
 by SubCUTAneous route. Sliding scale with meals  
     
   
   
   
  
 hydroCHLOROthiazide 25 mg tablet Commonly known as:  HYDRODIURIL Your last dose was: This morning Your next dose is:  Tomorrow Morning Take 1 Tab by mouth daily.   
 25 mg  
    
  
   
   
   
  
 JANUMET 50-1,000 mg per tablet Generic drug:  SITagliptin-metFORMIN Your next dose is: Take as directed Take 1 Tab by mouth two (2) times daily (with meals). 1 Tab  
    
  
   
   
  
   
  
 naproxen sodium 550 mg tablet Commonly known as:  Katha Ramos DS Your next dose is: Take as directed Take 1 Tab by mouth two (2) times daily (with meals). 550 mg  
    
  
   
   
  
   
  
 nitroglycerin 0.4 mg SL tablet Commonly known as:  NITROSTAT  
   
 1 Tab by SubLINGual route every five (5) minutes as needed for Chest Pain. 0.4 mg  
    
   
   
   
  
 omeprazole 40 mg capsule Commonly known as:  PRILOSEC Your next dose is: Take as directed Take 40 mg by mouth every other day. 40 mg  
    
   
   
   
  
 oxyCODONE IR 20 mg immediate release tablet Commonly known as:  Salomeneftali Dan Your last dose was: This morning at 7747 Take 20 mg by mouth every four (4) hours as needed (take Q 4 TO 6 HOURS PRN FOR PAIN. DO NOT EXCEED 5 TABLETS PER DAY). Indications: Pain 20 mg  
    
   
   
   
  
 PROAIR HFA 90 mcg/actuation inhaler Generic drug:  albuterol Take 2 Puffs by inhalation every four (4) hours as needed. 2 Puff  
    
   
   
   
  
 traMADol 50 mg tablet Commonly known as:  ULTRAM  
   
 Take 1-2 Tabs by mouth every six (6) hours as needed for Pain. Max Daily Amount: 400 mg.  
  mg  
    
   
   
   
  
  
STOP taking these medications   
 azithromycin 250 mg tablet Commonly known as:  ZITHROMAX Z-GRUPO  
   
  
 benzonatate 200 mg capsule Commonly known as:  TESSALON Where to Get Your Medications These medications were sent to [address] [place] [phone] [address] Phone:  [phone] insulin glargine 100 unit/mL injection Information on where to get these meds will be given to you by the nurse or doctor. ! Ask your nurse or doctor about these medications  
  guaiFENesin  mg ER tablet Discharge Instructions DISCHARGE SUMMARY from Nurse PATIENT INSTRUCTIONS: 
 
 
F-face looks uneven A-arms unable to move or move unevenly S-speech slurred or non-existent T-time-call 911 as soon as signs and symptoms begin-DO NOT go Back to bed or wait to see if you get better-TIME IS BRAIN. Warning Signs of HEART ATTACK Call 911 if you have these symptoms: 
? Chest discomfort. Most heart attacks involve discomfort in the center of the chest that lasts more than a few minutes, or that goes away and comes back. It can feel like uncomfortable pressure, squeezing, fullness, or pain. ? Discomfort in other areas of the upper body. Symptoms can include pain or discomfort in one or both arms, the back, neck, jaw, or stomach. ? Shortness of breath with or without chest discomfort. ? Other signs may include breaking out in a cold sweat, nausea, or lightheadedness. Don't wait more than five minutes to call [address]! Fast action can save your life. Calling 911 is almost always the fastest way to get lifesaving treatment. Emergency Medical Services staff can begin treatment when they arrive  up to an hour sooner than if someone gets to the hospital by car. The discharge information has been reviewed with the patient. The patient verbalized understanding. Discharge medications reviewed with the patient and appropriate educational materials and side effects teaching were provided. ___________________________________________________________________________________________________________________________________ Chronic Obstructive Pulmonary Disease (COPD): Care Instructions Your Care Instructions Chronic obstructive pulmonary disease (COPD) is a general term for a group of lung diseases, including emphysema and chronic bronchitis.  People with COPD have decreased airflow in and out of the lungs, which makes it hard to breathe. The airways also can get clogged with thick mucus. Cigarette smoking is a major cause of COPD. Although there is no cure for COPD, you can slow its progress. Following your treatment plan and taking care of yourself can help you feel better and live longer. Follow-up care is a key part of your treatment and safety. Be sure to make and go to all appointments, and call your doctor if you are having problems. It's also a good idea to know your test results and keep a list of the medicines you take. How can you care for yourself at home? ?Staying healthy ? · Do not smoke. This is the most important step you can take to prevent more damage to your lungs. If you need help quitting, talk to your doctor about stop-smoking programs and medicines. These can increase your chances of quitting for good. ? · Avoid colds and flu. Get a pneumococcal vaccine shot. If you have had one before, ask your doctor whether you need a second dose. Get the flu vaccine every fall. If you must be around people with colds or the flu, wash your hands often. ? · Avoid secondhand smoke, air pollution, and high altitudes. Also avoid cold, dry air and hot, humid air. Stay at home with your windows closed when air pollution is bad. ?Medicines and oxygen therapy ? · Take your medicines exactly as prescribed. Call your doctor if you think you are having a problem with your medicine. ? · You may be taking medicines such as: ¨ Bronchodilators. These help open your airways and make breathing easier. Bronchodilators are either short-acting (work for 6 to 9 hours) or long-acting (work for 24 hours). You inhale most bronchodilators, so they start to act quickly. Always carry your quick-relief inhaler with you in case you need it while you are away from home. ¨ Corticosteroids (prednisone, budesonide).  These reduce airway inflammation. They come in pill or inhaled form. You must take these medicines every day for them to work well. ? · A spacer may help you get more inhaled medicine to your lungs. Ask your doctor or pharmacist if a spacer is right for you. If it is, ask how to use it properly. ? · Do not take any vitamins, over-the-counter medicine, or herbal products without talking to your doctor first.  
? · If your doctor prescribed antibiotics, take them as directed. Do not stop taking them just because you feel better. You need to take the full course of antibiotics. ? · Oxygen therapy boosts the amount of oxygen in your blood and helps you breathe easier. Use the flow rate your doctor has recommended, and do not change it without talking to your doctor first.  
Activity ? · Get regular exercise. Walking is an easy way to get exercise. Start out slowly, and walk a little more each day. ? · Pay attention to your breathing. You are exercising too hard if you cannot talk while you are exercising. ? · Take short rest breaks when doing household chores and other activities. ? · Learn breathing methods-such as breathing through pursed lips-to help you become less short of breath. ? · If your doctor has not set you up with a pulmonary rehabilitation program, talk to him or her about whether rehab is right for you. Rehab includes exercise programs, education about your disease and how to manage it, help with diet and other changes, and emotional support. Diet ? · Eat regular, healthy meals. Use bronchodilators about 1 hour before you eat to make it easier to eat. Eat several small meals instead of three large ones. Drink beverages at the end of the meal. Avoid foods that are hard to chew. ? · Eat foods that contain protein so that you do not lose muscle mass. ? · Talk with your doctor if you gain too much weight or if you lose weight without trying. ?Mental health ? · Talk to your family, friends, or a therapist about your feelings. It is normal to feel frightened, angry, hopeless, helpless, and even guilty. Talking openly about bad feelings can help you cope. If these feelings last, talk to your doctor. When should you call for help? Call 911 anytime you think you may need emergency care. For example, call if: 
? · You have severe trouble breathing. ?Call your doctor now or seek immediate medical care if: 
? · You have new or worse trouble breathing. ? · You cough up blood. ? · You have a fever. ? Watch closely for changes in your health, and be sure to contact your doctor if: 
? · You cough more deeply or more often, especially if you notice more mucus or a change in the color of your mucus. ? · You have new or worse swelling in your legs or belly. ? · You are not getting better as expected. Where can you learn more? Go to http://april-karla.info/. Katherine Peña in the search box to learn more about \"Chronic Obstructive Pulmonary Disease (COPD): Care Instructions. \" Current as of: May 12, 2017 Content Version: 11.4 © 1234-1459 [employer]. Care instructions adapted under license by [employer] (which disclaims liability or warranty for this information). If you have questions about a medical condition or this instruction, always ask your healthcare professional. [place] [address] any warranty or liability for your use of this information. [employer] Announcement We are excited to announce that we are making your provider's discharge notes available to you in Knodium. You will see these notes when they are completed and signed by the physician that discharged you from your recent hospital stay.   If you have any questions or concerns about any information you see in Knodium, please call the Health Information Department where you were seen or reach out to your Primary Care Provider for more information about your plan of care. Introducing [facility]! [employer] introduces Baton patient portal. Now you can access parts of your medical record, email your doctor's office, and request medication refills online. 1. In your internet browser, go to [email or web address] 2. Click on the First Time User? Click Here link in the Sign In box. You will see the New Member Sign Up page. 3. Enter your Baton Access Code exactly as it appears below. You will not need to use this code after youve completed the sign-up process. If you do not sign up before the expiration date, you must request a new code. · Baton Access Code: [email or web address] Expires: 5/29/2018  4:57 PM 
 
4. Enter the last four digits of your Social Security Number (xxxx) and Date of Birth (mm/dd/yyyy) as indicated and click Submit. You will be taken to the next sign-up page. 5. Create a Baton ID. This will be your Baton login ID and cannot be changed, so think of one that is secure and easy to remember. 6. Create a Baton password. You can change your password at any time. 7. Enter your Password Reset Question and Answer. This can be used at a later time if you forget your password. 8. Enter your e-mail address. You will receive e-mail notification when new information is available in [address] 9. Click Sign Up. You can now view and download portions of your medical record. 10. Click the Download Summary menu link to download a portable copy of your medical information. If you have questions, please visit the Frequently Asked Questions section of the Baton website. Remember, Baton is NOT to be used for urgent needs. For medical emergencies, dial 911. Now available from your iPhone and Android! Providers Seen During Your Hospitalization Provider Specialty Primary office phone Darcia Sandhoff, MD Internal Medicine [phone] Immunizations Administered for This Admission Name Date  
 TB Skin Test (PPD) Intradermal 3/7/2018 Your Primary Care Physician (PCP) Primary Care Physician Office Phone Office Fax [address] [facility], [address] [place] [address] Road [phone] You are allergic to the following Allergen Reactions Contrast Dye (Iodine) Anaphylaxis Pineapple Anaphylaxis Recent Documentation Weight BMI Smoking Status 130 kg 34.89 kg/m2 Former Smoker Emergency Contacts Name Discharge Info Relation Home Work Mobile Peg Denson DISCHARGE CAREGIVER [3] Spouse [3] [phone] Patient Belongings The following personal items are in your possession at time of discharge: 
     Visual Aid: Glasses, With patient             Clothing: Gaye Alcantara, Footwear Please provide this summary of care documentation to your next provider. Signatures-by signing, you are acknowledging that this After Visit Summary has been reviewed with you and you have received a copy. Patient Signature:  ____________________________________________________________ Date:  ____________________________________________________________  
  
[facility] Provider Signature:  ____________________________________________________________ Date:  ____________________________________________________________

## 2018-03-07 NOTE — H&P
Hospitalist H&P Note     Admit Date:  3/6/2018  5:30 PM   Name:  Angelia Linton   Age:  79 y.o.  :  1950   MRN:  [id number]   PCP:  Willa Puentes MD  Treatment Team: Attending Provider: Fran Velásquez MD    HPI:       Mr. Sherice Moses is a 78 yo male with PMH of COPD followed by pulmonary, DM2 evaluated with progressive dyspnea/ confusion/hypoxia referred per direct admit per PCP. Was seen in the ED last week for similar complaints with cough and dyspnea. Denies home O2 use, no fever, has LE edema, no chest pain or anorexia. Per wife confusion has been progressive over prior weeks. Was noted to have O2 of 86 RA at PCP office that improved with 2 L NC. Additionally PCP concerned regarding new appearing type I second degree AV block on EKG. He is followed by [facility] cardiology chronically            10 systems reviewed and negative except as noted in HPI. - easy bruising, paresthesia, and neuropathy           Past Medical History:   Diagnosis Date    Arthritis     CAD (coronary artery disease)     Chronic obstructive pulmonary disease (HCC)     Diabetes ([address]     GERD (gastroesophageal reflux disease)     Hypertension     Mobitz I 3/6/2018    Psychiatric disorder       Past Surgical History:   Procedure Laterality Date    CARDIAC SURG PROCEDURE UNLIST      PCI LAD    HX CATARACT REMOVAL Bilateral 2017    HX ORTHOPAEDIC      KNEE X 4      Allergies   Allergen Reactions    Contrast Dye [Iodine] Anaphylaxis    Pineapple Anaphylaxis      Social History   Substance Use Topics    Smoking status: Former Smoker     Quit date:     Smokeless tobacco: Never Used      Comment: cigars - 1 per month    Alcohol use No      Family History   Problem Relation Age of Onset    Hypertension Mother     Stroke Maternal Grandfather     Heart Disease Paternal Grandfather       There is no immunization history for the selected administration types on file for this patient.   PTA Medications:  Prior to Admission Medications   Prescriptions Last Dose Informant Patient Reported? Taking? SITagliptin-metFORMIN (JANUMET) 50-1,000 mg per tablet   Yes No   Sig: Take 1 Tab by mouth two (2) times daily (with meals). albuterol (PROAIR HFA) 90 mcg/actuation inhaler   Yes No   Sig: Take 2 Puffs by inhalation every four (4) hours as needed. aspirin delayed-release 81 mg tablet   Yes No   Sig: Take  by mouth daily. atorvastatin (LIPITOR) 80 mg tablet   No No   Sig: Take 1 Tab by mouth nightly. azithromycin (ZITHROMAX Z-GRUPO) 250 mg tablet   No No   Sig: Take ONE a day, for five days  Take TWO on FIRST day  Disp: 6 pills   benazepril (LOTENSIN) 40 mg tablet   No No   Sig: Take 1 Tab by mouth daily. benzonatate (TESSALON) 200 mg capsule   No No   Sig: Take 1 Cap by mouth three (3) times daily as needed for Cough for up to 7 days. carvedilol (COREG) 6.25 mg tablet   Yes No   Sig: Take  by mouth two (2) times daily (with meals). clopidogrel (PLAVIX) 75 mg tab   No No   Sig: Take 1 Tab by mouth daily. hydroCHLOROthiazide (HYDRODIURIL) 25 mg tablet   No No   Sig: Take 1 Tab by mouth daily. insulin glargine (LANTUS) 100 unit/mL injection   Yes No   Si Units by SubCUTAneous route nightly. insulin lispro (HUMALOG) 100 unit/mL injection   Yes No   Sig: by SubCUTAneous route. Sliding scale with meals   naproxen sodium (ANAPROX DS) 550 mg tablet   No No   Sig: Take 1 Tab by mouth two (2) times daily (with meals). nitroglycerin (NITROSTAT) 0.4 mg SL tablet   No No   Si Tab by SubLINGual route every five (5) minutes as needed for Chest Pain. omeprazole (PRILOSEC) 40 mg capsule   Yes No   Sig: Take 40 mg by mouth every other day. oxyCODONE IR (ROXICODONE) 20 mg immediate release tablet   Yes No   Sig: Take 20 mg by mouth every six (6) hours as needed. traMADol (ULTRAM) 50 mg tablet   No No   Sig: Take 1-2 Tabs by mouth every six (6) hours as needed for Pain. Max Daily Amount: 400 mg. Facility-Administered Medications: None       Objective:   Patient Vitals for the past 24 hrs:   Temp Pulse Resp BP SpO2   03/06/18 2014 97.4 °F (36.3 °C) 77 20 (!) 159/96 92 %   03/06/18 1745 97.6 °F (36.4 °C) (!) 104 20 158/89 93 %     Oxygen Therapy  O2 Sat (%): 92 % (03/06/18 2014)    Intake/Output Summary (Last 24 hours) at 03/06/18 2026  Last data filed at 03/06/18 1745   Gross per 24 hour   Intake              240 ml   Output                0 ml   Net              240 ml       Physical Exam:  General:    Well nourished. Alert. No distress, confused   Eyes:   Normal sclera. Extraocular movements intact. PERRLA  ENT:  Normocephalic, atraumatic. Moist mucous membranes  CV:   RRR. No m/r/g. Lungs:  Diffuse wheezing and poor overall air movement   Abdomen: Soft, nontender, nondistended. Bowel sounds normal.   Extremities: Warm and dry. No cyanosis, 1+ edema. Neurologic: CN II-XII grossly intact. 5/5 extremity strength   Skin:     No rashes or jaundice. Normal coloration  Psych:  Disoriented     I reviewed the labs, imaging, EKGs, telemetry, and other studies done this admission. Data Review:   Recent Results (from the past 24 hour(s))   GLUCOSE, POC    Collection Time: 03/06/18  6:02 PM   Result Value Ref Range    Glucose (POC) 317 (H) 65 - 100 mg/dL   CBC WITH AUTOMATED DIFF    Collection Time: 03/06/18  7:45 PM   Result Value Ref Range    WBC 6.2 4.3 - 11.1 K/uL    RBC 4.35 4.23 - 5.67 M/uL    HGB 13.4 (L) 13.6 - 17.2 g/dL    HCT 40.9 (L) 41.1 - 50.3 %    MCV 94.0 79.6 - 97.8 FL    MCH 30.8 26.1 - 32.9 PG    MCHC 32.8 31.4 - 35.0 g/dL    RDW 13.8 11.9 - 14.6 %    PLATELET 591 011 - 041 K/uL    MPV 11.3 10.8 - 14.1 FL    DF AUTOMATED      NEUTROPHILS 73 43 - 78 %    LYMPHOCYTES 18 13 - 44 %    MONOCYTES 8 4.0 - 12.0 %    EOSINOPHILS 1 0.5 - 7.8 %    BASOPHILS 0 0.0 - 2.0 %    IMMATURE GRANULOCYTES 0 0.0 - 5.0 %    ABS. NEUTROPHILS 4.5 1.7 - 8.2 K/UL    ABS. LYMPHOCYTES 1.1 0.5 - 4.6 K/UL    ABS. MONOCYTES 0.5 0.1 - 1.3 K/UL    ABS. EOSINOPHILS 0.1 0.0 - 0.8 K/UL    ABS. BASOPHILS 0.0 0.0 - 0.2 K/UL    ABS. IMM. GRANS. 0.0 0.0 - 0.5 K/UL   EKG, 12 LEAD, INITIAL    Collection Time: 03/06/18  7:59 PM   Result Value Ref Range    Ventricular Rate 91 BPM    Atrial Rate 91 BPM    P-R Interval 212 ms    QRS Duration 86 ms    Q-T Interval 402 ms    QTC Calculation (Bezet) 494 ms    Calculated P Axis 66 degrees    Calculated R Axis 77 degrees    Calculated T Axis 72 degrees    Diagnosis       Sinus rhythm with 1st degree A-V block  Prolonged QT  Abnormal ECG  When compared with ECG of 28-FEB-2018 10:24,  No significant change was found         All Micro Results     Procedure Component Value Units Date/Time    INFLUENZA A & B AG (RAPID TEST) [[id number]]     Order Status:  Sent Specimen:  Nasopharyngeal from Nasopharyngeal           Other Studies:  Xr Chest Sngl V    Result Date: 3/6/2018  PORTABLE CHEST, February 6, 2018 at 1914 hours CLINICAL HISTORY:  COPD. COMPARISON:  None. FINDINGS:  AP erect images demonstrate no confluent infiltrate or significant pleural fluid. The heart size is within normal limits without evidence of congestive heart failure or pneumothorax. The bony thorax appears intact on this view. IMPRESSION:  NO ACUTE CARDIOPULMONARY DISEASE IDENTIFIED. Ct Head Wo Cont    Result Date: 3/6/2018  NONCONTRAST HEAD CT CLINICAL HISTORY:  Confusion. COMPARISON:  None. REPORT:   Standard non contrast head CT demonstrates no definite intracranial mass effect, hemorrhage, or evidence of acute geographic infarction. The ventricles are normal in size and configuration, accounting for the patient's age. Orbits and  paranasal sinuses are clear where imaged. Bone windows demonstrate no definite fracture or destruction. IMPRESSION:     NO ACUTE INTRACRANIAL ABNORMALITY IDENTIFIED AT NONCONTRAST CT.        Assessment and Plan:     Hospital Problems as of 3/6/2018  Date Reviewed: 11/7/2017          Codes Class Noted - Resolved POA    COPD exacerbation ([facility] ICD-10-CM: J44.1  ICD-9-CM: 491.21  3/6/2018 - Present Unknown        * (Principal)Acute respiratory failure with hypoxia ([facility] ICD-10-CM: J96.01  ICD-9-CM: 518.81  3/6/2018 - Present Unknown        Mobitz I ICD-10-CM: I44.1  ICD-9-CM: 426.13  3/6/2018 - Present Yes        Acute encephalopathy ICD-10-CM: G93.40  ICD-9-CM: 348.30  3/6/2018 - Present Yes        Coronary artery disease of native artery of native heart with stable angina pectoris (HCC) (Chronic) ICD-10-CM: I25.118  ICD-9-CM: 414.01, 413.9  10/13/2017 - Present Yes    Overview Addendum 10/18/2017  3:54 PM by Toni Vazquez MD     08/2012:  3.0x13 Cypher mLAD  10/2017:  3.5x32 Synergy pLAD, 3.0x38.  3.5x20 Synergy dRCA             Essential hypertension (Chronic) ICD-10-CM: I10  ICD-9-CM: 401.9  10/13/2017 - Present Yes        Controlled type 2 diabetes mellitus with diabetic polyneuropathy, with long-term current use of insulin (HCC) (Chronic) ICD-10-CM: E11.42, Z79.4  ICD-9-CM: 250.60, 357.2, V58.67  10/13/2017 - Present Yes              PLAN:  · Admit to remote tele   · EGG repeated and with first degree AV block  · Trend troponin   · Start IV solumedrol, budesonide and duoneb  · Supplemental O2 to wean as tolerant   · CXR ordered on admit negative   · CT head negative, check UA and ABG for encephalopathy workup  · Continue lantus and SSI   · STAT labs ordered, PCP results reviewed in care everywhere   · PPD/PT/OT/case management for dispo    Discharge planning:    DVT ppx: lovenox   Code status:  Full  Estimated LOS:  Greater than 2 midnights  Risk:  high  Care plan: wife Leonor Knight [phone]   Signed:  Santiago Gonzalez MD

## 2018-03-07 NOTE — PROGRESS NOTES
Hourly rounds complete this shift, patient physically hostile toward R/T for ABG's. Patient states he asked for pain medication for 4 hours, but never rang the call bell, and did not indicate or ask for pain medication during night or during morning med pass.

## 2018-03-07 NOTE — INTERDISCIPLINARY ROUNDS
Interdisciplinary team rounds were held 3/7/2018 with the following team members:Care Management, Nursing and Physician. Plan of care discussed. See clinical pathway and/or care plan for interventions and desired outcomes.

## 2018-03-07 NOTE — PROGRESS NOTES
Pt's . 10 units of humalog given per sliding scale. Dr. Klaus Orellana notified. Orders received to give additional 4 units of humalog for a total of 14 units. . Will continue to monitor.

## 2018-03-07 NOTE — PROGRESS NOTES
PT Note:  PT/OT orders received/reviewed and evaluation attempted. Patient was getting diabetes education. Evaluation will be re-attempted as schedule and patient's status allow.   Thanks,   Yennifer Bragg, PT, DPT

## 2018-03-07 NOTE — PROGRESS NOTES
Pt arrived unit via EMS transportation. Pt alert with some confusion. Dual skin assessment performed with Mae ALVAREZ. Pt has about 2+ edema on LLE, left heel slightly red. Pt has some bruises on left hand. Pt is 2L NC, oxygen saturation at 93%. Pt oriented to room and call light. Pt denies pain at this time. Will continue to monitor.

## 2018-03-07 NOTE — PROGRESS NOTES
Patient is a potential COPD Bundle Payment for Care Improvement (BPCI) patient and will be followed for 90 days post acute care. Bundle letter given. RRAT- 26- Palliative Care information given for future needs, but not needed at this time. PCP- Bailey- will need follow up 3-5 days after discharge. COPD education given- pt readily accepted education, but has trouble remembering- needs reinforcement. Did not want to see the video at this time. Spacer given. PT/ OT consulted, but turned down today due to pain. Patient lives wife. Independent. Played [employer] football in the past.  Patient would like wife to decide on Health  to follow. May benefit from home care PT, but patient is active and likes to swim. Can arrange if needed at discharge. 24 hour Help line number given. Patient's  Cell# [phone]. Care Management Interventions  PCP Verified by CM: Yes  Transition of Care Consult (CM Consult):  Other  Physical Therapy Consult: Yes  Occupational Therapy Consult: Yes  Current Support Network: Lives with Spouse  Confirm Follow Up Transport: Family  Plan discussed with Pt/Family/Caregiver: Yes  Freedom of Choice Offered: Yes  Discharge Location  Discharge Placement: Home    Cherylene Half, RN- [facility], BSN  Care Transition/ [facility]  [phone]

## 2018-03-07 NOTE — PROGRESS NOTES
George Ramos from CT called about CT of the chest w/contrast ordered for pt. Pt is allergic to contrast dye. Per pt \"I can't do contrast, it sends me to shock real bad\". Pt is refusing CT done d/t use of contrast. Will let Dr. Misty Sanches know. Per Dr. Misty Sanches, CT of the chest will be cancelled.

## 2018-03-07 NOTE — PROGRESS NOTES
Attempted to get ABG three times and couldn't obtain any blood. Pt states he's not getting stuck again. Alfredo Gunn RN, notified.

## 2018-03-07 NOTE — PROGRESS NOTES
Hospitalist Progress Note    3/7/2018  Admit Date: 3/6/2018  5:30 PM   NAME: Carmen Miranda   :  1950   MRN:  [id number]   Attending: Elaine Colon MD  PCP:  Terri De La Cruz MD    SUBJECTIVE:   Patient 62X with pmhx of COPD follows with pulm, DM2, presentes with increased dyspnea,hypoxia and confusion as direct admit from PCP. Does not use home o2. O2 sat on RA of 86% at PCP office - correcting with [facility]. Also with finding of first degree AVB. 3/7 - patient reports that dyspnea started \"all of a sudden\" associated with intermittent pains sounding pleuritic in nature    Review of Systems negative with exception of pertinent positives noted above  PHYSICAL EXAM     Visit Vitals    /83 (BP 1 Location: Right arm, BP Patient Position: At rest)    Pulse 95    Temp 97.7 °F (36.5 °C)    Resp 18    Wt 130 kg (286 lb 9.6 oz)    SpO2 90%    BMI 34.89 kg/m2      Temp (24hrs), Av.7 °F (36.5 °C), Min:97.4 °F (36.3 °C), Max:98 °F (36.7 °C)    Oxygen Therapy  O2 Sat (%): 90 % (18 1517)  Pulse via Oximetry: 89 beats per minute (18 1343)  O2 Device: Nasal cannula (18 1343)  O2 Flow Rate (L/min): 3 l/min (18 1343)  FIO2 (%): 28 % (18 0243)    Intake/Output Summary (Last 24 hours) at 18 1550  Last data filed at 18 1520   Gross per 24 hour   Intake              480 ml   Output                0 ml   Net              480 ml      General: No acute distress    Lungs:  CTA Bilaterally.    Heart:  Regular rate and rhythm,  No murmur, rub, or gallop  Abdomen: Soft, Non distended, Non tender, Positive bowel sounds  Extremities: No cyanosis, clubbing or edema  Neurologic:  No focal deficits    ASSESSMENT      Active Hospital Problems    Diagnosis Date Noted    COPD exacerbation ([address] 2018    Acute respiratory failure with hypoxia ([address] 2018    Mobitz I 2018    Acute encephalopathy 2018    Controlled type 2 diabetes mellitus with diabetic polyneuropathy, with long-term current use of insulin ([facility] 10/13/2017    Coronary artery disease of native artery of native heart with stable angina pectoris ([facility] 10/13/2017     08/2012:  3.0x13 Cypher mLAD  10/2017:  3.5x32 Synergy pLAD, 3.0x38. 3.5x20 Synergy dRCA      Essential hypertension 10/13/2017     A/P  -COPD exacerbation - cont wean ox as allowed. weaning IV steroids and scheduled BD's. Will add mucinex/flutter valve. Considered CT chest r/o PE given complaint of pleuritic pain at home but patient allergic to contrast. Consider VQ scan if pain recurs but discomfort likely secondary to COPD. - Acute encephalopathy - seemingly improved. Suspect hypoxia contributed  - DM/SIHG - uncontrolled. Increase lantus. Cont ssi. Check a1c  - CAD - stable. trops negative.  ECG first degree AVB    DVT Prophylaxis: lovenox sq    Signed By: Anjelica Avila DO     March 7, 2018

## 2018-03-07 NOTE — PROGRESS NOTES
PT Note:  PT orders received/reviewed and evaluation attempted. Patient was refusing to participate due to pain. Evaluation will be re-attempted as schedule and patient's status allow.   Thanks,  Liza Stephens, PT, DPT

## 2018-03-07 NOTE — PROGRESS NOTES
charted for Volunteer visit. Volunteer spoke briefly with pt. Pt was nervous and upset and declined prayer. Volunteer gave pt a spiritual care card.

## 2018-03-07 NOTE — PROGRESS NOTES
Critical value d-dimer 1.17 called by lab. Dr. Valeria Martinez notified. No new orders received. Will continue to monitor.

## 2018-03-08 NOTE — PROGRESS NOTES
Problem: Self Care Deficits Care Plan (Adult)  Goal: *Acute Goals and Plan of Care (Insert Text)  1. Patient will complete full body bathing and dressing with modified independence and adaptive equipment as needed. 2. Patient will complete toileting with modified independence. 3. Patient will tolerate 23 minutes of OT treatment with 2 rest breaks to increase activity tolerance for ADLs. 4. Patient will complete functional transfers with modified independence and adaptive equipment as needed. 5. Patient will complete grooming tasks in standing at sink level after setup. Timeframe: 7 visits       OCCUPATIONAL THERAPY: Initial Assessment 3/8/2018  INPATIENT: Hospital Day: 3  [place] / [place] / Product Type: Medicare /      NAME/AGE/GENDER: Mehnaz Gil is a 79 y.o. male   PRIMARY DIAGNOSIS:  copd  COPD exacerbation ([address]  Acute respiratory failure with hypoxia (HCC) Acute respiratory failure with hypoxia ([address] Acute respiratory failure with hypoxia ([address]        ICD-10: Treatment Diagnosis:    · Generalized Muscle Weakness (M62.81)  · Other lack of cordination (R27.8)  · Difficulty in walking, Not elsewhere classified (R26.2)   Precautions/Allergies:    falls, Contrast dye [iodine] and Pineapple      ASSESSMENT:     Mr. Jad Del Castillo is a 78 YO R dominant WM who was admitted with above and presents with decreased self care, transfers, ambulation, activity tolerance, balance, and overall general functional mobility. Pt presently on 3 L/min O2, stats 95% at rest. Normally on room air, though has nebulizer and inhalers at home. Agreeable to OT evaluation. Pt A & O x 4; however, seems \"odd\" with conversation, tends to ramble and easily off topic. Pt reports he lives with his wife (but she works during the day at a [employer]), 1 story home, normally independent, uses a cane out in the community with ambulation. Loves to swim and walks his dogs every day. Drives. Loves to cook.  Pt relates chronic pain B feet/hands from neuropathy. This interferes with his [facility] and ability to complete those tasks, \"But I push on and do what I have to do. \" Pt today was in left sidelying on his bed. Had gotten himself back to bed he reports. OT had seen pt sitting in chair 25 mins prior while pt was eating lunch. OT had helped pt setup his lunch tray and opened packages for him. Mod I with bed mobility and up to the edge for further testing. B UE are WFLs for basic self care tasks, generally weak overall, but activity tolerance and shortness of breath during tasks are his biggest challenge. Pt sensation decreased to light touch B hands and seems hypersensitive at times. Reports pain in his feet and hands sometimes keeps him in the bed. Takes pain meds at home every 6 hours to combat this. Pt transfers with SBA, ambulates in room on 3 L/min with CGA to SBA, slow tanya. Pt O2 >90% with mobility. Pt would benefit from skilled OT to maximize his independence and return to his PLOF. Pt may benefit from further therapy at discharge. Discussed with pt possible HHOT or aquatic therapy.       This section established at most recent assessment   PROBLEM LIST (Impairments causing functional limitations):  1. Decreased ADL/Functional Activities  2. Decreased Transfer Abilities  3. Decreased Ambulation Ability/Technique  4. Decreased Balance  5. Increased Pain  6. Decreased Activity Tolerance  7. Decreased Pacing Skills   INTERVENTIONS PLANNED: (Benefits and precautions of occupational therapy have been discussed with the patient.)  1. Activities of daily living training  2. Adaptive equipment training  3. Balance training  4. Community reintergration  5. Donning&doffing training  6. Group therapy  7. Therapeutic activity  8. Therapeutic exercise  9. Safety training     TREATMENT PLAN: Frequency/Duration: Follow patient 3x per week to address above goals.   Rehabilitation Potential For Stated Goals: Good RECOMMENDED REHABILITATION/EQUIPMENT: (at time of discharge pending progress): Due to the probability of continued deficits (see above) this patient will likely need continued skilled occupational therapy after discharge. Equipment:    TBD based on progress              OCCUPATIONAL PROFILE AND HISTORY:   History of Present Injury/Illness (Reason for Referral):  presents with decreased transfers, ambulation, activity tolerance, balance, and overall general functional mobility s/p hospital admission with ARF, COPD exacerbation. Pt presently on 3 L/min O2, stats 94% at rest. Pt A & O x 4; however, seems \"odd\" with conversation, tends to ramble. Pt relates lives with spouse, 1 story home, normally independent, occ uses str cane with ambulation. Pt relates chronic pain B feet/hands from neuropathy. Pt drives, states active with swimming, walking, pt normally on room air. Pt today sitting in chair on arrival, MMT in sitting R LE 5/5, L hip flex 4/5, all other L LE 5/5. Pt sensation decreased to touch B knees distally, seems hypersensitive at times. Pt transfers with SBA, ambulates in room on 3 L/min with CGA to SBA, slow tanya. Pt O2 >90% with mobility. PT to follow for acute care needs to address deficits noted above, assessing increased ambulation and balance with O2. Pt may benefit from further therapy at discharge. Discussed with pt possible HHPT or aquatic therapy.       Past Medical History/Comorbidities:   Mr. Angelia Bridges  has a past medical history of Arthritis; CAD (coronary artery disease); Chronic obstructive pulmonary disease ([address] Diabetes ([address] GERD (gastroesophageal reflux disease); Hypertension; Mobitz I (3/6/2018); and Psychiatric disorder. He also has no past medical history of Aneurysm ([address] Asthma; Autoimmune disease ([address] Cancer ([address] Chronic kidney disease; Difficult intubation; Heart failure ([address] Liver disease; Malignant hyperthermia due to anesthesia; Morbid obesity ([address]  Nausea & vomiting; Pseudocholinesterase deficiency; PUD (peptic ulcer disease); Seizures ([facility] Sleep apnea; Stroke [facility]); Thromboembolus ([facility] or Thyroid disease. Mr. Corby Arora  has a past surgical history that includes pr cardiac surg procedure unlist; hx orthopaedic; and hx cataract removal (Bilateral, 2017). Social History/Living Environment:   Home Environment: Private residence  # Steps to Enter: 1  One/Two Story Residence: One story  Living Alone: No  Support Systems: Spouse/Significant Other/Partner, Family member(s), Friends \ neighbors  Patient Expects to be Discharged to[de-identified] Private residence  Current DME Used/Available at Home: Cane, straight, Nebulizer  Tub or Shower Type: Shower  Prior Level of Function/Work/Activity:  Lives with wife in 1 level home, active and independent prior, used cane out in community, drives, enjoys pool therapy and walking his dogs. Wife works during the day, so pt is alone. She comes home for lunch that he cooks. Dominant Side:         RIGHT   Number of Personal Factors/Comorbidities that affect the Plan of Care: Extensive review of physical, cognitive, and psychosocial performance (3+):  HIGH COMPLEXITY   ASSESSMENT OF OCCUPATIONAL PERFORMANCE[de-identified]   Activities of Daily Living:           Basic ADLs (From Assessment) Complex ADLs (From Assessment)   Basic ADL  Feeding: Setup  Oral Facial Hygiene/Grooming: Minimum assistance  Bathing: Minimum assistance  Upper Body Dressing: Minimum assistance  Lower Body Dressing: Minimum assistance  Toileting: Minimum assistance Instrumental ADL  Meal Preparation: Minimum assistance (reports he does all the cooking)  Homemaking: Total assistance (wife does all housework)  Medication Management: Setup  Financial Management: Minimum assistance   Grooming/Bathing/Dressing Activities of Daily Living     Cognitive Retraining  Safety/Judgement: Awareness of environment; Fall prevention                 Functional Transfers  Toilet Transfer : Contact guard assistance  Tub Transfer: Moderate assistance  Shower Transfer: Contact guard assistance     Bed/Mat Mobility  Rolling: Modified independent  Supine to Sit: Modified independent  Sit to Supine: Modified independent  Sit to Stand: Supervision  Bed to Chair: Supervision  Scooting: Modified independent       Most Recent Physical Functioning:   Gross Assessment:  AROM: Within functional limits  Strength: Within functional limits  Coordination: Generally decreased, functional (reports decreased sensation limits his coordination)  Tone: Normal  Sensation: Impaired (PN in B hands, decreased LT and localization)               Posture:  Posture (WDL): Exceptions to WDL  Posture Assessment: Forward head, Rounded shoulders  Balance:  Sitting: Intact  Standing: Impaired  Standing - Static: Good  Standing - Dynamic : Fair Bed Mobility:  Rolling: Modified independent  Supine to Sit: Modified independent  Sit to Supine: Modified independent  Scooting: Modified independent  Wheelchair Mobility:     Transfers:  Sit to Stand: Supervision  Stand to Sit: Supervision  Bed to Chair: Supervision            Patient Vitals for the past 6 hrs:   BP BP Patient Position SpO2 O2 Flow Rate (L/min) Pulse   03/08/18 0810 142/65 Sitting 91 % - 100   03/08/18 0829 - - 93 % 3 l/min -   03/08/18 1015 - - 94 % 3 l/min -   03/08/18 1141 - - 92 % 3 l/min -   03/08/18 1211 149/66 Sitting (!) 88 % - 97       Mental Status  Neurologic State: Alert  Orientation Level: Oriented X4  Cognition: Appropriate for age attention/concentration, Follows commands  Perception: Appears intact  Perseveration: No perseveration noted  Safety/Judgement: Awareness of environment, Fall prevention                          Physical Skills Involved:  1. Balance  2. Activity Tolerance  3. Sensation  4. Fine Motor Control  5. Pain (Chronic) Cognitive Skills Affected (resulting in the inability to perform in a timely and safe manner):   1. none Psychosocial Skills Affected:  1. Habits/Routines  2. Self-Awareness  3. Awareness of Others  4. Social Roles   Number of elements that affect the Plan of Care: 5+:  HIGH COMPLEXITY   CLINICAL DECISION MAKIN[address] [place] [address] AM-PAC 6 Clicks   Daily Activity Inpatient Short Form  How much help from another person does the patient currently need. .. Total A Lot A Little None   1. Putting on and taking off regular lower body clothing? [] 1   [] 2   [x] 3   [] 4   2. Bathing (including washing, rinsing, drying)? [] 1   [] 2   [x] 3   [] 4   3. Toileting, which includes using toilet, bedpan or urinal?   [] 1   [] 2   [x] 3   [] 4   4. Putting on and taking off regular upper body clothing? [] 1   [] 2   [x] 3   [] 4   5. Taking care of personal grooming such as brushing teeth? [] 1   [] 2   [x] 3   [] 4   6. Eating meals? [] 1   [] 2   [] 3   [x] 4   © , Trustees of [address] [place] [id number], under license to [employer]. All rights reserved      Score:  Initial: 19, completed 3/8/2018 Most Recent: X (Date: -- )    Interpretation of Tool:  Represents activities that are increasingly more difficult (i.e. Bed mobility, Transfers, Gait). Score 24 23 22-20 19-15 14-10 9-7 6     Modifier CH CI CJ CK CL CM CN      ? Self Care:     - CURRENT STATUS: CK - 40%-59% impaired, limited or restricted    - GOAL STATUS: CI - 1%-19% impaired, limited or restricted    - D/C STATUS:  ---------------To be determined---------------  [place] / [place] / Product Type: Medicare /      Medical Necessity:     · Patient demonstrates good rehab potential due to higher previous functional level. Reason for Services/Other Comments:  · Patient continues to require skilled intervention due to s/p above and decreased ADLs and mobility.    Use of outcome tool(s) and clinical judgement create a POC that gives a: MODERATE COMPLEXITY         TREATMENT:   (In addition to Assessment/Re-Assessment sessions the following treatments were rendered)     Pre-treatment Symptoms/Complaints: \"Having the breath and energy is my biggest problem right now. \"  Pain: Initial:   Pain Intensity 1: 0 (I'm in pain all the time,it doesn't matter what number I say)  Post Session:  unchanged     Assessment/Reassessment only, no treatment provided today    Braces/Orthotics/Lines/Etc:   · O2 Device: Nasal cannula  Treatment/Session Assessment:    · Response to Treatment:  Tolerated well, easily fatigued with even talking at times, min A overall   · Interdisciplinary Collaboration:   o Physical Therapist  o Occupational Therapist  o Registered Nurse  o   o Certified Nursing Assistant/Patient Care Technician  · After treatment position/precautions:   o Supine in bed  o Bed/Chair-wheels locked  o Bed in low position  o Call light within reach  o RN notified  o Side rails x 2   · Compliance with Program/Exercises: Will assess as treatment progresses. Compliant today. · Recommendations/Intent for next treatment session: \"Next visit will focus on advancements to more challenging activities and reduction in assistance provided\".   Total Treatment Duration:  20 mins  OT Patient Time In/Time Out  Time In: 1220  Time Out: [facility], OT  Barry Tao, MS, OTR/L            \

## 2018-03-08 NOTE — PROGRESS NOTES
Hourly rounds completed throughout this shift. Pt resting in bed;  Complains of BL hand and foot pain, meds given  Will continue to monitor and report to day shift nurse.

## 2018-03-08 NOTE — PROGRESS NOTES
Pt asked for pain medicine. Tylenol offered to pt since that's the only pain medicine prescribed for pt at this time. Pt refused tylenol. Per pt\" That will not do anything for my pain, I usually take 20 mg of Oxycodone every 4 to 6 hours. \" Dr. Katty Littlejohn notified, per Dr. Katty Littlejohn to check with pharmacy for pt's PTA home meds and order accordingly. Checked with pharmacy and ordered 20 mg of Oxycodone every 6 hours for pain per Dr. Katty Littlejohn. Pt appears satisfied with pain medicine orders. Will continue to monitor.

## 2018-03-08 NOTE — PROGRESS NOTES
Hospitalist Progress Note    3/8/2018  Admit Date: 3/6/2018  5:30 PM   NAME: Angelia Linton   :  1950   MRN:  [id number]   Attending: Fran Velásquez MD  PCP:  Willa Puentes MD    SUBJECTIVE:   Mr. Brittney Min is a 69M with pmhx of COPD follows with pulm, DM2, who presents with increased dyspnea,hypoxia and confusion as direct admit from PCP. He does not use home o2. O2 sat on RA of 86% at PCP office - correcting with [facility]. Also with finding of first degree AVB. He is currently being treated for COPD exacerbation secondary to viral URI. He reports that dyspnea is improving and that the cough is improving as well.           Review of Systems negative with exception of pertinent positives noted above  PHYSICAL EXAM     Visit Vitals    /65 (BP 1 Location: Left arm, BP Patient Position: Sitting)    Pulse 100    Temp 97.2 °F (36.2 °C)    Resp 20    Wt 130 kg (286 lb 9.6 oz)    SpO2 93%    BMI 34.89 kg/m2      Temp (24hrs), Av.6 °F (36.4 °C), Min:97.2 °F (36.2 °C), Max:98 °F (36.7 °C)    Oxygen Therapy  O2 Sat (%): 93 % (18)  Pulse via Oximetry: 95 beats per minute (18)  O2 Device: Nasal cannula (18)  O2 Flow Rate (L/min): 3 l/min (18)  FIO2 (%): 32 % (18)    Intake/Output Summary (Last 24 hours) at 18  Last data filed at 18   Gross per 24 hour   Intake              480 ml   Output                0 ml   Net              480 ml      General: No acute distress    Lungs:  +wheezing in bilateral posterior lobes  Heart:  Regular rate and rhythm,  No murmur, rub, or gallop  Abdomen: Soft, Non distended, Non tender, Positive bowel sounds  Extremities: No cyanosis, clubbing or edema  Neurologic:  No focal deficits    ASSESSMENT      Active Hospital Problems    Diagnosis Date Noted    COPD with acute exacerbation ([address] 2018    Acute respiratory failure with hypoxia (HCC) 2018    Trinity I 2018    Acute encephalopathy 03/06/2018    Controlled type 2 diabetes mellitus with diabetic polyneuropathy, with long-term current use of insulin ([facility] 10/13/2017    Coronary artery disease of native artery of native heart with stable angina pectoris ([facility] 10/13/2017     08/2012:  3.0x13 Cypher mLAD  10/2017:  3.5x32 Synergy pLAD, 3.0x38. 3.5x20 Synergy dRCA      Essential hypertension 10/13/2017     A/P  -Acute COPD exacerbation secondary to viral URI - Will change DuoNebs to Q4H, will decreased prednisone to 40mg daily (for 5 total days of steroids)   Considered CT chest r/o PE given complaint of pleuritic pain at home but patient allergic to contrast. Consider VQ scan if pain recurs but discomfort likely secondary to COPD. No pain at this time  - Acute encephalopathy - seemingly improved. Suspect hypoxia contributed  - DM/SIHG - uncontrolled. Increase lantus today to 40 units daily and scheduled TID insulin (can down titrate tomorrow pending POC glucose) Cont ssi. Check a1c  - CAD - stable. trops negative.  ECG first degree AVB    DVT Prophylaxis: lovenox sq  Dispo: Lives at home with his spouse, discharge home in the AM likely    Signed By: Leonard Mccallum MD     March 8, 2018

## 2018-03-08 NOTE — PROGRESS NOTES
Problem: Mobility Impaired (Adult and Pediatric)  Goal: *Acute Goals and Plan of Care (Insert Text)  LTG:  (1.)Mr. Lama will move from supine to sit and sit to supine , scoot up and down and roll side to side with INDEPENDENT within 7 treatment day(s) from flat surface without handrail. (2.)Mr. Lama will transfer from bed to chair and chair to bed with INDEPENDENT using the least restrictive device within 7 treatment day(s). (3.)Mr. Lama will ambulate with INDEPENDENT for 250 feet with the least restrictive device within 7 treatment day(s), O2 stats >90% with activity. (4.)Mr. Lama will be independent with HEP for B LE strengthening and overall mobility within 7 days. _____________________________________________________________________________________    PHYSICAL THERAPY: Initial Assessment, AM 3/8/2018  INPATIENT: Hospital Day: 3  [place] / Product Type: Medicare /      NAME/AGE/GENDER: Daniela Levine is a 79 y.o. male   PRIMARY DIAGNOSIS: copd  COPD exacerbation ([address]  Acute respiratory failure with hypoxia (HCC) Acute respiratory failure with hypoxia ([address] Acute respiratory failure with hypoxia (HCC)        ICD-10: Treatment Diagnosis:   · Difficulty in walking, Not elsewhere classified (R26.2)   Precaution/Allergies:  Contrast dye [iodine] and Pineapple      ASSESSMENT:     Mr. Nina Sosa presents with decreased transfers, ambulation, activity tolerance, balance, and overall general functional mobility s/p hospital admission with ARF, COPD exacerbation. Pt presently on 3 L/min O2, stats 94% at rest. Pt A & O x 4; however, seems \"odd\" with conversation, tends to ramble. Pt relates lives with spouse, 1 story home, normally independent, occ uses str cane with ambulation. Pt relates chronic pain B feet/hands from neuropathy. Pt drives, states active with swimming, walking, pt normally on room air.  Pt today sitting in chair on arrival, MMT in sitting R LE 5/5, L hip flex 4/5, all other L LE 5/5. Pt sensation decreased to touch B knees distally, seems hypersensitive at times. Pt transfers with SBA, ambulates in room on 3 L/min with CGA to SBA, slow tanya. Pt O2 >90% with mobility. PT to follow for acute care needs to address deficits noted above, assessing increased ambulation and balance with O2. Pt may benefit from further therapy at discharge. Discussed with pt possible HHPT or aquatic therapy. This section established at most recent assessment   PROBLEM LIST (Impairments causing functional limitations):  1. Decreased ADL/Functional Activities  2. Decreased Transfer Abilities  3. Decreased Ambulation Ability/Technique  4. Decreased Balance  5. Increased Pain  6. Decreased Activity Tolerance  7. Decreased Hurt with Home Exercise Program   INTERVENTIONS PLANNED: (Benefits and precautions of physical therapy have been discussed with the patient.)  1. Balance Exercise  2. Bed Mobility  3. Family Education  4. Gait Training  5. Home Exercise Program (HEP)  6. Therapeutic Activites  7. Therapeutic Exercise/Strengthening  8. Transfer Training  9. Group Therapy     TREATMENT PLAN: Frequency/Duration: 3 times a week for duration of hospital stay  Rehabilitation Potential For Stated Goals: Good     RECOMMENDED REHABILITATION/EQUIPMENT: (at time of discharge pending progress): Due to the probability of continued deficits (see above) this patient will likely need continued skilled physical therapy after discharge. Equipment:    None at this time              HISTORY:   History of Present Injury/Illness (Reason for Referral):  Mr. Monica Lucio is a 78 yo male with PMH of COPD followed by pulmonary, DM2 evaluated with progressive dyspnea/ confusion/hypoxia referred per direct admit per PCP. Was seen in the ED last week for similar complaints with cough and dyspnea. Denies home O2 use, no fever, has LE edema, no chest pain or anorexia.  Per wife confusion has been progressive over prior weeks. Was noted to have O2 of 86 RA at PCP office that improved with 2 L NC. Additionally PCP concerned regarding new appearing type I second degree AV block on EKG. He is followed by [facility] cardiology chronically    Past Medical History/Comorbidities:   Mr. Solis Cardenas  has a past medical history of Arthritis; CAD (coronary artery disease); Chronic obstructive pulmonary disease ([address] Diabetes ([address] GERD (gastroesophageal reflux disease); Hypertension; Mobitz I (3/6/2018); and Psychiatric disorder. He also has no past medical history of Aneurysm ([address] Asthma; Autoimmune disease ([address] Cancer ([address] Chronic kidney disease; Difficult intubation; Heart failure ([address] Liver disease; Malignant hyperthermia due to anesthesia; Morbid obesity ([address] Nausea & vomiting; Pseudocholinesterase deficiency; PUD (peptic ulcer disease); Seizures ([address] Sleep apnea; Stroke [facility]); Thromboembolus ([address] or Thyroid disease. Mr. Solis Cardenas  has a past surgical history that includes pr cardiac surg procedure unlist; hx orthopaedic; and hx cataract removal (Bilateral, 2017). Social History/Living Environment:   Home Environment: Private residence  # Steps to Enter: 1  One/Two Story Residence: One story  Living Alone: No  Support Systems: Family member(s)  Patient Expects to be Discharged to[de-identified] Private residence  Current DME Used/Available at Home: [employer], straight  Tub or Shower Type: Shower  Prior Level of Function/Work/Activity:  Lives with spouse; normally independent amb and ADLs, uses cane occ; drives  Personal Factors:          Sex:  male        Age:  79 y.o.         Overall Behavior:  agreeable   Number of Personal Factors/Comorbidities that affect the Plan of Care:  CAD, COPD, DM 3+: HIGH COMPLEXITY   EXAMINATION:   Most Recent Physical Functioning:   Gross Assessment:  AROM: Within functional limits (B LE)  Strength: Generally decreased, functional (R LE 5/5; L hip 4/5, all other 5/5)  Coordination: Generally decreased, functional  Sensation: Impaired (pt relates neuropathy and numbness B knees distally)               Posture:  Posture (WDL): Exceptions to WDL  Posture Assessment: Forward head, Rounded shoulders  Balance:  Sitting: Intact  Standing: Impaired  Standing - Static: Good  Standing - Dynamic : Fair Bed Mobility:  Rolling:  (up in chair)  Sit to Supine:  (left up in chair)  Scooting: Independent  Wheelchair Mobility:     Transfers:  Sit to Stand: Supervision  Stand to Sit: Supervision  Gait:     Base of Support: Widened  Speed/Christine: Slow  Step Length: Left shortened;Right shortened  Swing Pattern: Left symmetrical;Right symmetrical  Gait Abnormalities: Decreased step clearance  Distance (ft): 20 Feet (ft) (in room, O2 3 L/min, stats >90%)  Assistive Device:  (none)  Ambulation - Level of Assistance: Stand-by assistance;Contact guard assistance  Interventions: Safety awareness training;Verbal cues      Body Structures Involved:  1. Lungs  2. Muscles Body Functions Affected:  1. Respiratory  2. Movement Related Activities and Participation Affected:  1. General Tasks and Demands  2. Mobility  3. Self Care   Number of elements that affect the Plan of Care: 4+: HIGH COMPLEXITY   CLINICAL PRESENTATION:   Presentation: Evolving clinical presentation with changing clinical characteristics: MODERATE COMPLEXITY   CLINICAL DECISION MAKIN [facility] Inpatient Short Form  How much difficulty does the patient currently have. .. Unable A Lot A Little None   1. Turning over in bed (including adjusting bedclothes, sheets and blankets)? [] 1   [] 2   [] 3   [x] 4   2. Sitting down on and standing up from a chair with arms ( e.g., wheelchair, bedside commode, etc.)   [] 1   [] 2   [] 3   [x] 4   3. Moving from lying on back to sitting on the side of the bed? [] 1   [] 2   [x] 3   [] 4   How much help from another person does the patient currently need. ..  Total A Lot A Little None   4. Moving to and from a bed to a chair (including a wheelchair)? [] 1   [] 2   [x] 3   [] 4   5. Need to walk in hospital room? [] 1   [] 2   [x] 3   [] 4   6. Climbing 3-5 steps with a railing? [] 1   [] 2   [x] 3   [] 4   © 2007, Trustees of [address] [place] [address] [address] [id number], under license to [employer]. All rights reserved      Score:  Initial: 20 Most Recent: X (Date: -- )    Interpretation of Tool:  Represents activities that are increasingly more difficult (i.e. Bed mobility, Transfers, Gait). Score 24 23 22-20 19-15 14-10 9-7 6     Modifier CH CI CJ CK CL CM CN      ? Mobility - Walking and Moving Around:     - CURRENT STATUS: CJ - 20%-39% impaired, limited or restricted    - GOAL STATUS: CI - 1%-19% impaired, limited or restricted    - D/C STATUS:  ---------------To be determined---------------  [place] / [place] / Product Type: Medicare /      Medical Necessity:     · Patient is expected to demonstrate progress in strength, range of motion, balance and coordination to decrease assistance required with overall functional mobility, transfers, ambulation. · Patient demonstrates good rehab potential due to higher previous functional level. Reason for Services/Other Comments:  · Patient continues to require present interventions due to patient's inability to perform bed mobility, transfers, ambulation safely and effectively.    Use of outcome tool(s) and clinical judgement create a POC that gives a: Clear prediction of patient's progress: LOW COMPLEXITY            TREATMENT:   (In addition to Assessment/Re-Assessment sessions the following treatments were rendered)   Pre-treatment Symptoms/Complaints:  \"I suppose we can walk in here\"  Pain: Initial:   Pain Intensity 1: 0  Post Session:  Does not rate but states increased B foot pain with ambulation     Assessment/Reassessment only, no treatment provided today    Braces/Orthotics/Lines/Etc:   · O2 Device: Nasal cannula  Treatment/Session Assessment:    · Response to Treatment:  Tolerated well in room  · Interdisciplinary Collaboration:   o Physical Therapist  o Registered Nurse  · After treatment position/precautions:   o Up in chair  o Bed/Chair-wheels locked  o Bed in low position  o Call light within reach  o RN notified   · Compliance with Program/Exercises: Will assess as treatment progresses. · Recommendations/Intent for next treatment session: \"Next visit will focus on advancements to more challenging activities\".   Total Treatment Duration:  PT Patient Time In/Time Out  Time In: 1015  Time Out: 181 Suzy Lugo, PT

## 2018-03-08 NOTE — INTERDISCIPLINARY ROUNDS
Interdisciplinary team rounds were held 3/8/2018 with the following team members:Care Management, Nursing, Physical Therapy and Physician. Plan of care discussed. See clinical pathway and/or care plan for interventions and desired outcomes. Anticipating discharge to home tomorrow.

## 2018-03-09 NOTE — PROGRESS NOTES
Care Management Interventions  PCP Verified by CM: Yes  Transition of Care Consult (CM Consult):  Other, Home Health (Pt is receptive to [facility] follow up care if indicated.)  [facility] - INPATIENT: Yes  Physical Therapy Consult: Yes  Occupational Therapy Consult: Yes  Current Support Network: Lives with Spouse  Confirm Follow Up Transport: Family  Plan discussed with Pt/Family/Caregiver: Yes  Freedom of Choice Offered: Yes  Discharge Location  Discharge Placement: Home with home health

## 2018-03-09 NOTE — WOUND CARE
Tip of right great toe with 1.5cm fissure, surrounding skin with callous, patient states this area has been there awhile, Dr. Meng Adan follows outpatient. Right DP and PT pulses palpable. Recommend betadine paint while in acute care and return to Dr. Meng Adan outpatient. Will monitor.

## 2018-03-09 NOTE — INTERDISCIPLINARY ROUNDS
Interdisciplinary team rounds were held 3/9/2018 with the following team members:Care Management, Nursing, Physical Therapy and Physician. Plan of care discussed. See clinical pathway and/or care plan for interventions and desired outcomes. Anticipating discharge to home tomorrow.

## 2018-03-09 NOTE — PROGRESS NOTES
Hourly rounds performed; all pt needs met. Pt slept throughout night. Pain medication administered per [facility] for c/o chronic generalized pain. Right great toe dry/ cracked r/t DM; dusky color, pedal pulses palpable +1, cool BLE. Bed L/L, SR up x2, wife at bedside, call light/ personal items within reach. Bedside shift report to ANTONIO Box.

## 2018-03-09 NOTE — PROGRESS NOTES
Hospitalist Progress Note    3/9/2018  Admit Date: 3/6/2018  5:30 PM   NAME: Betsy Kowalski   :  1950   MRN:  [id number]   Attending: Edward Arthur MD  PCP:  Gibran Whitmore MD    SUBJECTIVE:   Mr. Federico Chi is a 69M with pmhx of COPD follows with pulm, DM2, who presents with increased dyspnea,hypoxia and confusion as direct admit from PCP. He does not use home o2. O2 sat on RA of 86% at PCP office - correcting with [facility]. Also with finding of first degree AVB. He is currently being treated for COPD exacerbation secondary to viral URI. He reports that dyspnea is unchanged. He reports generalized body pain.       Review of Systems negative with exception of pertinent positives noted above  PHYSICAL EXAM     Visit Vitals    /77    Pulse 93    Temp 97.5 °F (36.4 °C)    Resp 20    Wt 130 kg (286 lb 9.6 oz)    SpO2 92%    BMI 34.89 kg/m2      Temp (24hrs), Av.5 °F (36.4 °C), Min:97.3 °F (36.3 °C), Max:97.7 °F (36.5 °C)    Oxygen Therapy  O2 Sat (%): 92 % (18 0804)  Pulse via Oximetry: 98 beats per minute (18 0753)  O2 Device: Nasal cannula (18 0753)  O2 Flow Rate (L/min): 2 l/min (18 0753)  FIO2 (%): 32 % (18 1141)    Intake/Output Summary (Last 24 hours) at 18 0954  Last data filed at 18 1751   Gross per 24 hour   Intake              480 ml   Output                0 ml   Net              480 ml      General: No acute distress    Lungs:  +wheezing in bilateral posterior lobes  Heart:  Regular rate and rhythm,  No murmur, rub, or gallop  Abdomen: Soft, Non distended, Non tender, Positive bowel sounds  Extremities: No cyanosis, clubbing or edema  Neurologic:  No focal deficits    ASSESSMENT      Active Hospital Problems    Diagnosis Date Noted    COPD with acute exacerbation ([address] 2018    Acute respiratory failure with hypoxia (HCC) 2018    [facility] 2018    Acute encephalopathy 2018    Controlled type 2 diabetes mellitus with diabetic polyneuropathy, with long-term current use of insulin ([facility] 10/13/2017    Coronary artery disease of native artery of native heart with stable angina pectoris [facility] 10/13/2017     08/2012:  3.0x13 Cypher mLAD  10/2017:  3.5x32 Synergy pLAD, 3.0x38. 3.5x20 Synergy dRCA      Essential hypertension 10/13/2017     A/P  -Acute COPD exacerbation secondary to viral URI - Continue duonebs and prednisone (for 5 total days of steroids)  Continues to report pleuritic pain, will order CT chest  - Acute encephalopathy - Resolved Suspect hypoxia contributed  - DM/SIHG - uncontrolled. Increase lantus today to 45 units daily and scheduled TID insulin (can down titrate tomorrow pending POC glucose) Cont ssi. A1C 10.3%  - CAD - stable. trops negative.  ECG first degree AVB    DVT Prophylaxis: lovenox sq  Dispo: Lives at home with his spouse, discharge home in the AM likely     Signed By: Ceci Kennedy MD     March 9, 2018

## 2018-03-10 NOTE — PROGRESS NOTES
Hourly rounds performed; all pt needs met. Pt ambulated in hallways with assist; fairly tolerated. Slept throughout night; pain meds administered r/t chronic generalized pain. Anticipating discharge today. Bed L/L, SR up x2, call light/ personal items within reach. Bedside shift report to Leonidas Ramirez RN.

## 2018-03-10 NOTE — PROGRESS NOTES
Hospitalist Progress Note    3/10/2018  Admit Date: 3/6/2018  5:30 PM   NAME: Leonila Rao   :  1950   MRN:  [id number]   Attending: Vi Aponte MD  PCP:  Dangelo Gaines MD    SUBJECTIVE:   Mr. Lorin Mcleod is a 69M with pmhx of COPD follows with pulm, DM2, who presents with increased dyspnea,hypoxia and confusion. He is currently being treated for COPD exacerbation secondary to viral URI. He reports that dyspnea is unchanged and he continues to report a cough (non productive). He was afebrile overnight.       Review of Systems negative with exception of pertinent positives noted above  PHYSICAL EXAM     Visit Vitals    /64    Pulse 84    Temp 97.4 °F (36.3 °C)    Resp 18    Wt 130 kg (286 lb 9.6 oz)    SpO2 93%    BMI 34.89 kg/m2      Temp (24hrs), Av.7 °F (36.5 °C), Min:97.3 °F (36.3 °C), Max:98.1 °F (36.7 °C)    Oxygen Therapy  O2 Sat (%): 93 % (03/10/18 1109)  Pulse via Oximetry: 85 beats per minute (03/10/18 1109)  O2 Device: Nasal cannula (03/10/18 1109)  O2 Flow Rate (L/min): 2 l/min (03/10/18 1109)  FIO2 (%): 32 % (18 1141)    Intake/Output Summary (Last 24 hours) at 03/10/18 1427  Last data filed at 03/10/18 0852   Gross per 24 hour   Intake              780 ml   Output                0 ml   Net              780 ml      General: No acute distress    Lungs:  +wheezing in bilateral posterior lobes (improved air entry compared to yesterday)  Heart:  Regular rate and rhythm,  No murmur, rub, or gallop  Abdomen: Soft, Non distended, Non tender, Positive bowel sounds  Extremities: No cyanosis, clubbing or edema  Neurologic:  No focal deficits, oriented to person, place and time    ASSESSMENT      Active Hospital Problems    Diagnosis Date Noted    COPD with acute exacerbation ([facility] 2018    Acute respiratory failure with hypoxia (HCC) 2018    Mobitz I 2018    Acute encephalopathy 2018    Controlled type 2 diabetes mellitus with diabetic polyneuropathy, with long-term current use of insulin ([facility] 10/13/2017    Coronary artery disease of native artery of native heart with stable angina pectoris [facility] 10/13/2017     08/2012:  3.0x13 Cypher mLAD  10/2017:  3.5x32 Synergy pLAD, 3.0x38. 3.5x20 Synergy dRCA      Essential hypertension 10/13/2017     A/P  -Acute COPD exacerbation secondary to viral URI - Continue duonebs Q4H and prednisone day 4/5    - Acute encephalopathy - Resolved,suspect most likely secondary to delirium  - DM/SIHG - uncontrolled. Increase lantus today to 50 units daily and scheduled TID insulin (can down titrate tomorrow pending POC glucose) Cont ssi. A1C 10.3%  - CAD - stable. trops negative.  ECG first degree AVB    DVT Prophylaxis: lovenox sq  Dispo: Lives at home with his spouse, and will likely be discharged home tomorrow if dyspnea improved and able to be weaned off O2    Signed By: Bertha Doss MD     March 10, 2018

## 2018-03-10 NOTE — PROGRESS NOTES
; Dr. Thomas Mantel notified. Order to increase Lispro to 15units, continue with 45units Lantus, and recheck BS at 0000. Will continue to monitor.

## 2018-03-11 NOTE — PROGRESS NOTES
DC instructions reviewed with patient and wife. Patient and wife agree they do not want home health. Both verbalize understanding of instructions, follow up appointments and prescriptions. Patient would like something for pain before DC, RN notified.

## 2018-03-11 NOTE — DISCHARGE SUMMARY
Hospitalist Discharge Summary     Admit Date:  3/6/2018  5:30 PM   Name:  Shannon Dan   Age:  79 y.o.  :  1950   MRN:  [id number]   PCP:  Valdez Conner MD  Treatment Team: Attending Provider: Robinson Fernandez MD; Utilization Review: Jayce Finney, ANTONIO; Transitional Nurse Navigator: Ryne Garcia RN    Problem List for this Hospitalization:  Hospital Problems as of 3/11/2018  Date Reviewed: 3/8/2018          Codes Class Noted - Resolved POA    COPD with acute exacerbation ([facility] ICD-10-CM: J44.1  ICD-9-CM: 491.21  3/6/2018 - Present Unknown        * (Principal)Acute respiratory failure with hypoxia ([facility] ICD-10-CM: J96.01  ICD-9-CM: 518.81  3/6/2018 - Present Unknown        [place] I ICD-10-CM: I44.1  ICD-9-CM: 426.13  3/6/2018 - Present Yes        Acute encephalopathy ICD-10-CM: G93.40  ICD-9-CM: 348.30  3/6/2018 - Present Yes        Coronary artery disease of native artery of native heart with stable angina pectoris ([facility] (Chronic) ICD-10-CM: I25.118  ICD-9-CM: 414.01, 413.9  10/13/2017 - Present Yes    Overview Addendum 10/18/2017  3:54 PM by Tee Castaneda MD     2012:  3.0x13 Cypher mLAD  10/2017:  3.5x32 Synergy pLAD, 3.0x38. 3.5x20 Synergy dRCA             Essential hypertension (Chronic) ICD-10-CM: I10  ICD-9-CM: 401.9  10/13/2017 - Present Yes        Controlled type 2 diabetes mellitus with diabetic polyneuropathy, with long-term current use of insulin (HCC) (Chronic) ICD-10-CM: E11.42, Z79.4  ICD-9-CM: 250.60, 357.2, V58.67  10/13/2017 - Present Yes                Admission HPI from 3/6/2018:    \"   Mr. Tee Ramos is a 80 yo male with PMH of COPD followed by pulmonary, DM2 evaluated with progressive dyspnea/ confusion/hypoxia referred per direct admit per PCP. Was seen in the ED last week for similar complaints with cough and dyspnea. Denies home O2 use, no fever, has LE edema, no chest pain or anorexia. Per wife confusion has been progressive over prior weeks.  Was noted to have O2 of 86 RA at PCP office that improved with 2 L NC. Additionally PCP concerned regarding new appearing type I second degree AV block on EKG. He is followed by [facility] cardiology chronically          [facility] Course:  Mr. Vivian Ochoa is a 78 yo male with PMH of COPD followed by pulmonary, DM2 evaluated with dyspnea and confusion. He was diagnosed with an acute COPD exacerbation secondary to viral UTI. He was started on DuoNebs and prednisone after which he clinically improved. His encephalopathy was most likely secondary to delirium and it resolved inpatient. His insulin requirements increased inpatient due to prednisone. He is s/p 5 days of prednisone for COPD exacerbation. He was seen by PT/OT inpatient and was ambulating well. He was discharged home on 3/11/2018 with PCP follow up within 1 week. He was advised on lantus modification pending his AM glucose readings and will follow up with his PCP. Follow up instructions below. Plan was discussed with the patient. All questions answered. Patient was stable at time of discharge and was instructed to call or return if there are any concerns or recurrence of symptoms. Diagnostic Imaging/Tests:   Xr Chest Sngl V    Result Date: 3/6/2018  PORTABLE CHEST, February 6, 2018 at 1914 hours CLINICAL HISTORY:  COPD. COMPARISON:  None. FINDINGS:  AP erect images demonstrate no confluent infiltrate or significant pleural fluid. The heart size is within normal limits without evidence of congestive heart failure or pneumothorax. The bony thorax appears intact on this view. IMPRESSION:  NO ACUTE CARDIOPULMONARY DISEASE IDENTIFIED. Ct Head Wo Cont    Result Date: 3/6/2018  NONCONTRAST HEAD CT CLINICAL HISTORY:  Confusion. COMPARISON:  None. REPORT:   Standard non contrast head CT demonstrates no definite intracranial mass effect, hemorrhage, or evidence of acute geographic infarction.   The ventricles are normal in size and configuration, accounting for the patient's age. Orbits and  paranasal sinuses are clear where imaged. Bone windows demonstrate no definite fracture or destruction. IMPRESSION:     NO ACUTE INTRACRANIAL ABNORMALITY IDENTIFIED AT NONCONTRAST CT. Echocardiogram results:  No results found for this visit on 03/06/18.       All Micro Results     Procedure Component Value Units Date/Time    INFLUENZA A & B AG (RAPID TEST) [[id number]] Collected:  03/06/18 1845    Order Status:  Canceled Specimen:  Nasopharyngeal from Nasopharyngeal           Labs: Results:       BMP, Mg, Phos Recent Labs      03/11/18   0522  03/10/18   0518  03/09/18   0519   NA  140  139  140   K  3.8  3.7  4.0   CL  100  101  103   CO2  32  30  29   AGAP  8  8  8   BUN  27*  30*  31*   CREA  1.21  1.08  1.08   CA  8.5  8.5  8.7   GLU  272*  232*  291*      CBC Recent Labs      03/11/18   0522  03/10/18   0518  03/09/18 0519   WBC  9.5  9.4  12.0*   RBC  4.05*  4.02*  4.03*   HGB  12.2*  12.3*  12.3*   HCT  38.2*  38.0*  37.7*   PLT  431  449  428   GRANS  77  70  78   LYMPH  18  23  17   EOS  0*  1  0*   MONOS  5  6  5   BASOS  0  0  0   IG  0  0  0   ANEU  7.3  6.5  9.3*   ABL  1.7  2.2  2.1   FAYE  0.0  0.1  0.0   ABM  0.5  0.6  0.6   ABB  0.0  0.0  0.0   AIG  0.0  0.0  0.0      LFT Recent Labs      03/09/18   0519   SGOT  10*   ALT  15   AP  80   TP  6.6   ALB  2.4*   GLOB  4.2*   AGRAT  0.6*      Cardiac Testing Lab Results   Component Value Date/Time    BNP 52 10/13/2017 03:35 PM    Troponin-I, Qt. <0.02 (L) 03/07/2018 10:24 AM    Troponin-I, Qt. <0.02 (L) 03/07/2018 03:17 AM    Troponin-I, Qt. <0.02 (L) 03/06/2018 07:45 PM      Coagulation Tests No results found for: PTP, INR, APTT   A1c Lab Results   Component Value Date/Time    Hemoglobin A1c 10.3 (H) 03/07/2018 05:14 PM      Lipid Panel No results found for: CHOL, CHOLPOCT, CHOLX, CHLST, CHOLV, 955350, HDL, LDL, LDLC, DLDLP, 741112, VLDLC, VLDL, TGLX, TRIGL, TRIGP, TGLPOCT, CHHD, CHHDX   Thyroid Panel No results found for: T4, T3U, TSH, TSHEXT     Most Recent UA Lab Results   Component Value Date/Time    Color YELLOW 03/07/2018 12:53 PM    Appearance CLEAR 03/07/2018 12:53 PM    Specific gravity 1.029 (H) 03/07/2018 12:53 PM    pH (UA) 7.0 03/07/2018 12:53 PM    Protein 100 (A) 03/07/2018 12:53 PM    Glucose >1000 03/07/2018 12:53 PM    Ketone 40 (A) 03/07/2018 12:53 PM    Bilirubin NEGATIVE  03/07/2018 12:53 PM    Blood NEGATIVE  03/07/2018 12:53 PM    Urobilinogen 1.0 03/07/2018 12:53 PM    Nitrites NEGATIVE  03/07/2018 12:53 PM    Leukocyte Esterase NEGATIVE  03/07/2018 12:53 PM        Allergies   Allergen Reactions    Contrast Dye [Iodine] Anaphylaxis    Pineapple Anaphylaxis     Immunization History   Administered Date(s) Administered    Influenza High Dose Vaccine PF 10/21/2015, 11/04/2016, 11/09/2017    Pneumococcal Conjugate (PCV-13) 09/01/2015    Pneumococcal Polysaccharide (PPSV-23) 09/28/2011    TB Skin Test (PPD) Intradermal 03/07/2018    Tdap 12/09/2013       All Labs from Last 24 Hrs:  Recent Results (from the past 24 hour(s))   GLUCOSE, POC    Collection Time: 03/10/18 11:05 AM   Result Value Ref Range    Glucose (POC) 317 (H) 65 - 100 mg/dL   GLUCOSE, POC    Collection Time: 03/10/18  3:09 PM   Result Value Ref Range    Glucose (POC) 252 (H) 65 - 100 mg/dL   GLUCOSE, POC    Collection Time: 03/10/18  8:08 PM   Result Value Ref Range    Glucose (POC) 478 (HH) 65 - 100 mg/dL   GLUCOSE, POC    Collection Time: 03/10/18 10:45 PM   Result Value Ref Range    Glucose (POC) 454 (HH) 65 - 100 mg/dL   GLUCOSE, POC    Collection Time: 03/11/18 12:25 AM   Result Value Ref Range    Glucose (POC) 410 (H) 65 - 100 mg/dL   GLUCOSE, POC    Collection Time: 03/11/18  2:03 AM   Result Value Ref Range    Glucose (POC) 358 (H) 65 - 100 mg/dL   GLUCOSE, POC    Collection Time: 03/11/18  4:26 AM   Result Value Ref Range    Glucose (POC) 327 (H) 65 - 724 mg/dL   METABOLIC PANEL, BASIC    Collection Time: 03/11/18  5:22 AM Result Value Ref Range    Sodium 140 136 - 145 mmol/L    Potassium 3.8 3.5 - 5.1 mmol/L    Chloride 100 98 - 107 mmol/L    CO2 32 21 - 32 mmol/L    Anion gap 8 7 - 16 mmol/L    Glucose 272 (H) 65 - 100 mg/dL    BUN 27 (H) 8 - 23 MG/DL    Creatinine 1.21 0.8 - 1.5 MG/DL    GFR est AA >60 >60 ml/min/1.73m2    GFR est non-AA >60 >60 ml/min/1.73m2    Calcium 8.5 8.3 - 10.4 MG/DL   CBC WITH AUTOMATED DIFF    Collection Time: 03/11/18  5:22 AM   Result Value Ref Range    WBC 9.5 4.3 - 11.1 K/uL    RBC 4.05 (L) 4.23 - 5.67 M/uL    HGB 12.2 (L) 13.6 - 17.2 g/dL    HCT 38.2 (L) 41.1 - 50.3 %    MCV 94.3 79.6 - 97.8 FL    MCH 30.1 26.1 - 32.9 PG    MCHC 31.9 31.4 - 35.0 g/dL    RDW 13.6 11.9 - 14.6 %    PLATELET 313 103 - 699 K/uL    MPV 11.1 10.8 - 14.1 FL    DF AUTOMATED      NEUTROPHILS 77 43 - 78 %    LYMPHOCYTES 18 13 - 44 %    MONOCYTES 5 4.0 - 12.0 %    EOSINOPHILS 0 (L) 0.5 - 7.8 %    BASOPHILS 0 0.0 - 2.0 %    IMMATURE GRANULOCYTES 0 0.0 - 5.0 %    ABS. NEUTROPHILS 7.3 1.7 - 8.2 K/UL    ABS. LYMPHOCYTES 1.7 0.5 - 4.6 K/UL    ABS. MONOCYTES 0.5 0.1 - 1.3 K/UL    ABS. EOSINOPHILS 0.0 0.0 - 0.8 K/UL    ABS. BASOPHILS 0.0 0.0 - 0.2 K/UL    ABS. IMM.  GRANS. 0.0 0.0 - 0.5 K/UL   GLUCOSE, POC    Collection Time: 03/11/18  7:54 AM   Result Value Ref Range    Glucose (POC) 233 (H) 65 - 100 mg/dL       Discharge Exam:  Patient Vitals for the past 24 hrs:   Temp Pulse Resp BP SpO2   03/11/18 0850 - 94 - 111/64 -   03/11/18 0823 - - - - 99 %   03/11/18 0751 99 °F (37.2 °C) 100 18 102/61 90 %   03/11/18 0434 - - - - 91 %   03/11/18 0158 98.2 °F (36.8 °C) 88 19 131/78 90 %   03/10/18 2321 - - - - 94 %   03/10/18 2243 98.6 °F (37 °C) 86 19 114/73 94 %   03/10/18 2001 98.1 °F (36.7 °C) 84 19 91/57 90 %   03/10/18 1942 - - - - 97 %   03/10/18 1551 - - - - 97 %   03/10/18 1442 97.4 °F (36.3 °C) 91 19 107/62 94 %   03/10/18 1109 - - - - 93 %   03/10/18 1059 97.4 °F (36.3 °C) 84 18 129/64 93 %     Oxygen Therapy  O2 Sat (%): 99 % (03/11/18 2773)  Pulse via Oximetry: 88 beats per minute (03/11/18 0823)  O2 Device: Nasal cannula (decreased to RA) (03/11/18 0823)  O2 Flow Rate (L/min): 2 l/min (03/11/18 0823)  FIO2 (%): 32 % (03/08/18 1141)    Intake/Output Summary (Last 24 hours) at 03/11/18 1047  Last data filed at 03/10/18 1217   Gross per 24 hour   Intake              240 ml   Output                0 ml   Net              240 ml       General:    Well nourished. Alert. No distress. Eyes:   Normal sclera. Extraocular movements intact. ENT:  Normocephalic, atraumatic. Moist mucous membranes  CV:   Regular rate and rhythm. No murmur, rub, or gallop. Lungs:  Clear to auscultation bilaterally. No wheezing, rhonchi, or rales. Abdomen: Soft, nontender, nondistended. Bowel sounds normal.   Extremities: Warm and dry. No cyanosis or edema. Neurologic: CN II-XII grossly intact. Sensation intact. Skin:     No rashes or jaundice. Psych:  Normal mood and affect. Discharge Info:   Current Discharge Medication List      START taking these medications    Details   guaiFENesin ER (MUCINEX) 600 mg ER tablet Take 1 Tab by mouth every twelve (12) hours. Qty: 30 Tab, Refills: 0         CONTINUE these medications which have CHANGED    Details   insulin glargine (LANTUS U-100 INSULIN) 100 unit/mL injection 45 Units by SubCUTAneous route nightly. Qty: 1 Vial, Refills: 0         CONTINUE these medications which have NOT CHANGED    Details   naproxen sodium (ANAPROX DS) 550 mg tablet Take 1 Tab by mouth two (2) times daily (with meals). Qty: 20 Tab, Refills: 0      traMADol (ULTRAM) 50 mg tablet Take 1-2 Tabs by mouth every six (6) hours as needed for Pain. Max Daily Amount: 400 mg. Qty: 20 Tab, Refills: 0    Associated Diagnoses: Atypical chest pain      atorvastatin (LIPITOR) 80 mg tablet Take 1 Tab by mouth nightly. Qty: 30 Tab, Refills: 11      benazepril (LOTENSIN) 40 mg tablet Take 1 Tab by mouth daily.   Qty: 30 Tab, Refills: 11 clopidogrel (PLAVIX) 75 mg tab Take 1 Tab by mouth daily. Qty: 30 Tab, Refills: 11      hydroCHLOROthiazide (HYDRODIURIL) 25 mg tablet Take 1 Tab by mouth daily. Qty: 30 Tab, Refills: 11      nitroglycerin (NITROSTAT) 0.4 mg SL tablet 1 Tab by SubLINGual route every five (5) minutes as needed for Chest Pain. Qty: 1 Bottle, Refills: 11      omeprazole (PRILOSEC) 40 mg capsule Take 40 mg by mouth every other day. SITagliptin-metFORMIN (JANUMET) 50-1,000 mg per tablet Take 1 Tab by mouth two (2) times daily (with meals). oxyCODONE IR (ROXICODONE) 20 mg immediate release tablet Take 20 mg by mouth every four (4) hours as needed (take Q 4 TO 6 HOURS PRN FOR PAIN. DO NOT EXCEED 5 TABLETS PER DAY). Indications: Pain      carvedilol (COREG) 6.25 mg tablet Take  by mouth two (2) times daily (with meals). albuterol (PROAIR HFA) 90 mcg/actuation inhaler Take 2 Puffs by inhalation every four (4) hours as needed. aspirin delayed-release 81 mg tablet Take  by mouth daily. insulin lispro (HUMALOG) 100 unit/mL injection by SubCUTAneous route. Sliding scale with meals         STOP taking these medications       benzonatate (TESSALON) 200 mg capsule Comments:   Reason for Stopping:         azithromycin (ZITHROMAX Z-GRUPO) 250 mg tablet Comments:   Reason for Stopping:                 Disposition: home    Activity: Activity as tolerated  Diet: DIET DIABETIC CONSISTENT CARB Regular; No Conc. Sweets    Follow-up Information     Follow up With Details Comments Contact Info    Demetrios Lesch, MD In 3 days Follow up with PCP 3-5 days after discharge [facility] [address] [facility]  [address] [facility] available to follow after discharge if needed.   Jaxon Viveros MD In 1 week  [address]  [address] [address] [facility]  [phone]              Time spent in patient discharge planning and coordination 45 minutes.     Signed:  Trini Rush MD

## 2018-03-11 NOTE — PROGRESS NOTES
Care Management Interventions  PCP Verified by CM: Yes  Transition of Care Consult (CM Consult): Other (Pt is receptive to [facility] follow up care if indicated.)  Physical Therapy Consult: Yes  Occupational Therapy Consult: Yes  Current Support Network: Lives with Spouse  Confirm Follow Up Transport: Family  Plan discussed with Pt/Family/Caregiver: Yes  Freedom of Choice Offered: Yes  Discharge Location  Discharge Placement: Home with outpatient services (Pt declined [facility] care but is to be contact by Health  for support.)  Pt/spouse decided that [facility] was not needed at this time. See Health  notes for anticipated follow up.

## 2018-03-11 NOTE — PROGRESS NOTES
[address]  Face to Face Encounter    Patients Name: Pippa Finney    YOB: 1950    Ordering Physician: Nasreen Sewell MD    Primary Diagnosis: copd  COPD exacerbation [facility])  Acute respiratory failure with hypoxia [facility])    Date of Face to Face:   3/11/2018                                  Face to Face Encounter findings are related to primary reason for home care:   yes. 1. I certify that the patient needs intermittent care as follows: skilled nursing care:  skilled observation/assessment, patient education  physical therapy: strengthening  occupational therapy:  ADL safety (ie. cooking, bathing, dressing)    2. I certify that this patient is homebound, that is: 1) patient requires the use of a unknown device, special transportation, or assistance of another to leave the home; or 2) patient's condition makes leaving the home medically contraindicated; and 3) patient has a normal inability to leave the home and leaving the home requires considerable and taxing effort. Patient may leave the home for infrequent and short duration for medical reasons, and occasional absences for non-medical reasons. Homebound status is due to the following functional limitations: Patient with strength deficits limiting the performance of all ADL's without caregiver assistance or the use of an assistive device. 3. I certify that this patient is under my care and that I, or a nurse practitioner or  [id number], or clinical nurse specialist, or certified nurse midwife, working with me, had a Face-to-Face Encounter that meets the physician Face-to-Face Encounter requirements.   The following are the clinical findings from the [address] [place] [address] encounter that support the need for skilled services and is a summary of the encounter: see hospital medical record    See discharge summary      Ratna Camacho LMSW  3/11/2018      THE FOLLOWING TO BE COMPLETED BY THE COMMUNITY PHYSICIAN:    I concur with the findings described above from the F2F encounter that this patient is homebound and in need of a skilled service.     Certifying Physician: _____________________________________      Printed Certifying Physician Name: _____________________________________    Date: _________________

## 2018-03-11 NOTE — PROGRESS NOTES
Pt has been D/C'd. Wife took pt home. Hourly rounds completed. Pt's oxygen level checked before d/c with 92% on RA. Pain medication given as ordered. All needs met at this time.

## 2018-03-11 NOTE — PROGRESS NOTES
Problem: Mobility Impaired (Adult and Pediatric)  Goal: *Acute Goals and Plan of Care (Insert Text)  LTG:  (1.)Mr. Lama will move from supine to sit and sit to supine , scoot up and down and roll side to side with INDEPENDENT within 7 treatment day(s) from flat surface without handrail. (2.)Mr. Lama will transfer from bed to chair and chair to bed with INDEPENDENT using the least restrictive device within 7 treatment day(s). (3.)Mr. Lama will ambulate with INDEPENDENT for 250 feet with the least restrictive device within 7 treatment day(s), O2 stats >90% with activity. (4.)Mr. Lama will be independent with HEP for B LE strengthening and overall mobility within 7 days. _____________________________________________________________________________________    PHYSICAL THERAPY: Initial Assessment, AM 3/11/2018  INPATIENT: Hospital Day: 6  [place] / Product Type: Medicare /      NAME/AGE/GENDER: Pippa Finney is a 79 y.o. male   PRIMARY DIAGNOSIS: copd  COPD exacerbation ([address]  Acute respiratory failure with hypoxia ([address] Acute respiratory failure with hypoxia ([address] Acute respiratory failure with hypoxia ([facility]        ICD-10: Treatment Diagnosis:   · Difficulty in walking, Not elsewhere classified (R26.2)   Precaution/Allergies:  Contrast dye [iodine] and Pineapple      ASSESSMENT:     Mr. Jaycee Lora presents moving around room turning off lights using SPC at arrival.  Initially his sat level was 83-84% on RA. When inquiring about using O2 pt responded \"I don't need that\". Attempted to educate him about reading from oximeter. Performed instruction and use of diaphragm when breathing and attempted exer. After 20min of instruction and exer, attempted to use what was discussed and apply while walking, applied oximeter, but pt replied \"I am leaving in 5min, I don't need this. Here's your meter\".   Unable to make assessment about any needs for home discharge due to pt refusal to acquire data during ambulation. Pt cont to refuse use of O2. This section established at most recent assessment   PROBLEM LIST (Impairments causing functional limitations):  1. Decreased ADL/Functional Activities  2. Decreased Transfer Abilities  3. Decreased Ambulation Ability/Technique  4. Decreased Balance  5. Increased Pain  6. Decreased Activity Tolerance  7. Decreased [place] with Home Exercise Program   INTERVENTIONS PLANNED: (Benefits and precautions of physical therapy have been discussed with the patient.)  1. Balance Exercise  2. Bed Mobility  3. Family Education  4. Gait Training  5. Home Exercise Program (HEP)  6. Therapeutic Activites  7. Therapeutic Exercise/Strengthening  8. Transfer Training  9. Group Therapy     TREATMENT PLAN: Frequency/Duration: 3 times a week for duration of hospital stay  Rehabilitation Potential For Stated Goals: Good     RECOMMENDED REHABILITATION/EQUIPMENT: (at time of discharge pending progress): Due to the probability of continued deficits (see above) this patient will likely need continued skilled physical therapy after discharge. Equipment:    None at this time              HISTORY:   History of Present Injury/Illness (Reason for Referral):  Mr. Jameel Stubbs is a 78 yo male with PMH of COPD followed by pulmonary, DM2 evaluated with progressive dyspnea/ confusion/hypoxia referred per direct admit per PCP. Was seen in the ED last week for similar complaints with cough and dyspnea. Denies home O2 use, no fever, has LE edema, no chest pain or anorexia. Per wife confusion has been progressive over prior weeks. Was noted to have O2 of 86 RA at PCP office that improved with 2 L NC. Additionally PCP concerned regarding new appearing type I second degree AV block on EKG. He is [facility] by [facility] cardiology chronically    Past Medical History/Comorbidities:   Mr. Jameel Stubbs  has a past medical history of Arthritis; CAD (coronary artery disease);  Chronic obstructive pulmonary disease ([facility] Diabetes ([facility] GERD (gastroesophageal reflux disease); Hypertension; Mobitz I (3/6/2018); and Psychiatric disorder. He also has no past medical history of Aneurysm ([facility] Asthma; Autoimmune disease ([address] Cancer ([facility] Chronic kidney disease; Difficult intubation; Heart failure ([facility] Liver disease; Malignant hyperthermia due to anesthesia; Morbid obesity ([facility] Nausea & vomiting; Pseudocholinesterase deficiency; PUD (peptic ulcer disease); Seizures ([facility] Sleep apnea; Stroke [facility]); Thromboembolus ([facility] or Thyroid disease. Mr. Antonio Maki  has a past surgical history that includes pr cardiac surg procedure unlist; hx orthopaedic; and hx cataract removal (Bilateral, 2017). Social History/Living Environment:   Home Environment: Private residence  # Steps to Enter: 1  One/Two Story Residence: One story  Living Alone: No  Support Systems: Spouse/Significant Other/Partner, Family member(s), Friends \ neighbors  Patient Expects to be Discharged to[de-identified] Private residence  Current DME Used/Available at Home: Cane, straight, Nebulizer  Tub or Shower Type: Shower  Prior Level of Function/Work/Activity:  Lives with spouse; normally independent amb and ADLs, uses cane occ; drives  Personal Factors:          Sex:  male        Age:  79 y.o. Overall Behavior:  agreeable   Number of Personal Factors/Comorbidities that affect the Plan of Care:  CAD, COPD, DM 3+: HIGH COMPLEXITY   EXAMINATION:   Most Recent Physical Functioning:   Gross Assessment:                  Posture:     Balance:  Sitting: Intact Bed Mobility:     Wheelchair Mobility:     Transfers:     Gait:            Body Structures Involved:  1. Lungs  2. Muscles Body Functions Affected:  1. Respiratory  2. Movement Related Activities and Participation Affected:  1. General Tasks and Demands  2. Mobility  3.  Self Care   Number of elements that affect the Plan of Care: 4+: HIGH COMPLEXITY   CLINICAL PRESENTATION:   Presentation: Evolving clinical presentation with changing clinical characteristics: MODERATE COMPLEXITY   CLINICAL DECISION MAKIN [facility] Mobility Inpatient Short Form  How much difficulty does the patient currently have. .. Unable A Lot A Little None   1. Turning over in bed (including adjusting bedclothes, sheets and blankets)? [] 1   [] 2   [] 3   [x] 4   2. Sitting down on and standing up from a chair with arms ( e.g., wheelchair, bedside commode, etc.)   [] 1   [] 2   [] 3   [x] 4   3. Moving from lying on back to sitting on the side of the bed? [] 1   [] 2   [x] 3   [] 4   How much help from another person does the patient currently need. .. Total A Lot A Little None   4. Moving to and from a bed to a chair (including a wheelchair)? [] 1   [] 2   [x] 3   [] 4   5. Need to walk in hospital room? [] 1   [] 2   [x] 3   [] 4   6. Climbing 3-5 steps with a railing? [] 1   [] 2   [x] 3   [] 4   © , Trustees of [address] [place] [address], under license to [employer]. All rights reserved      Score:  Initial: 20 Most Recent: X (Date: -- )    Interpretation of Tool:  Represents activities that are increasingly more difficult (i.e. Bed mobility, Transfers, Gait). Score 24 23 22-20 19-15 14-10 9-7 6     Modifier CH CI CJ CK CL CM CN      ? Mobility - Walking and Moving Around:     - CURRENT STATUS: CJ - 20%-39% impaired, limited or restricted    - GOAL STATUS: CI - 1%-19% impaired, limited or restricted    - D/C STATUS:  ---------------To be determined---------------  [place] / [place] / Product Type: Medicare /      Medical Necessity:     · Patient is expected to demonstrate progress in strength, range of motion, balance and coordination to decrease assistance required with overall functional mobility, transfers, ambulation. · Patient demonstrates good rehab potential due to higher previous functional level.   Reason for Services/Other Comments:  · Patient continues to require present interventions due to patient's inability to perform bed mobility, transfers, ambulation safely and effectively. Use of outcome tool(s) and clinical judgement create a POC that gives a: Clear prediction of patient's progress: LOW COMPLEXITY            TREATMENT:   (In addition to Assessment/Re-Assessment sessions the following treatments were rendered)   Pre-treatment Symptoms/Complaints:  \"I suppose we can walk in here\"  Pain: Initial:   Pain Intensity 1: 8  Pain Location 1: Other (comment) (everywhere)  Post Session:  Does not rate but states increased B foot pain with ambulation     Therapeutic Exercise: ( ):  Exercises per grid below to improve mobility, strength, balance and coordination. Required maximal verbal cues to promote proper body posture, promote proper body mechanics and promote proper body breathing techniques. Progressed pt refused as indicated. Braces/Orthotics/Lines/Etc:   · O2 Device: Nasal cannula  Treatment/Session Assessment:    · Response to Treatment:  Tolerated well in room  · Interdisciplinary Collaboration:   o Physical Therapist  o Registered Nurse  · After treatment position/precautions:   o Supine in bed  o Bed/Chair-wheels locked  o Bed in low position  o Call light within reach  o RN notified   · Compliance with Program/Exercises: Will assess as treatment progresses. · Recommendations/Intent for next treatment session: \"Next visit will focus on advancements to more challenging activities\".   Total Treatment Duration:  PT Patient Time In/Time Out  Time In: 0905  Time Out: 10 [facility], [facility]

## 2018-03-11 NOTE — PROGRESS NOTES
Hourly rounds performed; all pt needs met. BS remains high despite insulin throughout night (35 total of lispro and 50 of lantus). Bed L/L, SR up x2, call light/ personal items within reach. Bedside shift report to Leonidas Ramirez RN. conducted a detailed discussion... I had a detailed discussion with the patient and/or guardian regarding the historical points, exam findings, and any diagnostic results supporting the discharge/admit diagnosis.

## 2018-03-11 NOTE — DISCHARGE INSTRUCTIONS
DISCHARGE SUMMARY from Nurse    PATIENT INSTRUCTIONS:    After general anesthesia or intravenous sedation, for 24 hours or while taking prescription Narcotics:  · Limit your activities  · Do not drive and operate hazardous machinery  · Do not make important personal or business decisions  · Do  not drink alcoholic beverages  · If you have not urinated within 8 hours after discharge, please contact your surgeon on call. Report the following to your surgeon:  · Excessive pain, swelling, redness or odor of or around the surgical area  · Temperature over 100.5  · Nausea and vomiting lasting longer than 4 hours or if unable to take medications  · Any signs of decreased circulation or nerve impairment to extremity: change in color, persistent  numbness, tingling, coldness or increase pain  · Any questions      *  Please give a list of your current medications to your Primary Care Provider. *  Please update this list whenever your medications are discontinued, doses are      changed, or new medications (including over-the-counter products) are added. *  Please carry medication information at all times in case of emergency situations. These are general instructions for a healthy lifestyle:    No smoking/ No tobacco products/ Avoid exposure to second hand smoke  Surgeon General's Warning:  Quitting smoking now greatly reduces serious risk to your health. Obesity, smoking, and sedentary lifestyle greatly increases your risk for illness    A healthy diet, regular physical exercise & weight monitoring are important for maintaining a healthy lifestyle    You may be retaining fluid if you have a history of heart failure or if you experience any of the following symptoms:  Weight gain of 3 pounds or more overnight or 5 pounds in a week, increased swelling in our hands or feet or shortness of breath while lying flat in bed.   Please call your doctor as soon as you notice any of these symptoms; do not wait until your next office visit. Recognize signs and symptoms of STROKE:    F-face looks uneven    A-arms unable to move or move unevenly    S-speech slurred or non-existent    T-time-call 911 as soon as signs and symptoms begin-DO NOT go       Back to bed or wait to see if you get better-TIME IS BRAIN. Warning Signs of HEART ATTACK     Call 911 if you have these symptoms:   Chest discomfort. Most heart attacks involve discomfort in the center of the chest that lasts more than a few minutes, or that goes away and comes back. It can feel like uncomfortable pressure, squeezing, fullness, or pain.  Discomfort in other areas of the upper body. Symptoms can include pain or discomfort in one or both arms, the back, neck, jaw, or stomach.  Shortness of breath with or without chest discomfort.  Other signs may include breaking out in a cold sweat, nausea, or lightheadedness. Don't wait more than five minutes to call 911 - MINUTES MATTER! Fast action can save your life. Calling 911 is almost always the fastest way to get lifesaving treatment. Emergency Medical Services staff can begin treatment when they arrive -- up to an hour sooner than if someone gets to the hospital by car. The discharge information has been reviewed with the patient. The patient verbalized understanding. Discharge medications reviewed with the patient and appropriate educational materials and side effects teaching were provided. ___________________________________________________________________________________________________________________________________     Chronic Obstructive Pulmonary Disease (COPD): Care Instructions  Your Care Instructions    Chronic obstructive pulmonary disease (COPD) is a general term for a group of lung diseases, including emphysema and chronic bronchitis. People with COPD have decreased airflow in and out of the lungs, which makes it hard to breathe. The airways also can get clogged with thick mucus.  Cigarette smoking is a major cause of COPD. Although there is no cure for COPD, you can slow its progress. Following your treatment plan and taking care of yourself can help you feel better and live longer. Follow-up care is a key part of your treatment and safety. Be sure to make and go to all appointments, and call your doctor if you are having problems. It's also a good idea to know your test results and keep a list of the medicines you take. How can you care for yourself at home? ?Staying healthy  ? · Do not smoke. This is the most important step you can take to prevent more damage to your lungs. If you need help quitting, talk to your doctor about stop-smoking programs and medicines. These can increase your chances of quitting for good. ? · Avoid colds and flu. Get a pneumococcal vaccine shot. If you have had one before, ask your doctor whether you need a second dose. Get the flu vaccine every fall. If you must be around people with colds or the flu, wash your hands often. ? · Avoid secondhand smoke, air pollution, and high altitudes. Also avoid cold, dry air and hot, humid air. Stay at home with your windows closed when air pollution is bad. ?Medicines and oxygen therapy  ? · Take your medicines exactly as prescribed. Call your doctor if you think you are having a problem with your medicine. ? · You may be taking medicines such as:  ¨ Bronchodilators. These help open your airways and make breathing easier. Bronchodilators are either short-acting (work for 6 to 9 hours) or long-acting (work for 24 hours). You inhale most bronchodilators, so they start to act quickly. Always carry your quick-relief inhaler with you in case you need it while you are away from home. ¨ Corticosteroids (prednisone, budesonide). These reduce airway inflammation. They come in pill or inhaled form. You must take these medicines every day for them to work well. ? · A spacer may help you get more inhaled medicine to your lungs.  Ask your doctor or pharmacist if a spacer is right for you. If it is, ask how to use it properly. ? · Do not take any vitamins, over-the-counter medicine, or herbal products without talking to your doctor first.   ? · If your doctor prescribed antibiotics, take them as directed. Do not stop taking them just because you feel better. You need to take the full course of antibiotics. ? · Oxygen therapy boosts the amount of oxygen in your blood and helps you breathe easier. Use the flow rate your doctor has recommended, and do not change it without talking to your doctor first.   Activity  ? · Get regular exercise. Walking is an easy way to get exercise. Start out slowly, and walk a little more each day. ? · Pay attention to your breathing. You are exercising too hard if you cannot talk while you are exercising. ? · Take short rest breaks when doing household chores and other activities. ? · Learn breathing methods-such as breathing through pursed lips-to help you become less short of breath. ? · If your doctor has not set you up with a pulmonary rehabilitation program, talk to him or her about whether rehab is right for you. Rehab includes exercise programs, education about your disease and how to manage it, help with diet and other changes, and emotional support. Diet  ? · Eat regular, healthy meals. Use bronchodilators about 1 hour before you eat to make it easier to eat. Eat several small meals instead of three large ones. Drink beverages at the end of the meal. Avoid foods that are hard to chew. ? · Eat foods that contain protein so that you do not lose muscle mass. ? · Talk with your doctor if you gain too much weight or if you lose weight without trying. ?Mental health  ? · Talk to your family, friends, or a therapist about your feelings. It is normal to feel frightened, angry, hopeless, helpless, and even guilty. Talking openly about bad feelings can help you cope.  If these feelings last, talk to your doctor. When should you call for help? Call 911 anytime you think you may need emergency care. For example, call if:  ? · You have severe trouble breathing. ?Call your doctor now or seek immediate medical care if:  ? · You have new or worse trouble breathing. ? · You cough up blood. ? · You have a fever. ? Watch closely for changes in your health, and be sure to contact your doctor if:  ? · You cough more deeply or more often, especially if you notice more mucus or a change in the color of your mucus. ? · You have new or worse swelling in your legs or belly. ? · You are not getting better as expected. Where can you learn more? Go to http://april-karla.info/. Jeannette Soria in the search box to learn more about \"Chronic Obstructive Pulmonary Disease (COPD): Care Instructions. \"  Current as of: May 12, 2017  Content Version: 11.4  © 6121-8551 [employer]. Care instructions adapted under license by [employer] (which disclaims liability or warranty for this information). If you have questions about a medical condition or this instruction, always ask your healthcare professional. [place] [address] any warranty or liability for your use of this information.

## 2018-03-11 NOTE — PROGRESS NOTES
Hospitalist Progress Note    3/11/2018  Admit Date: 3/6/2018  5:30 PM   NAME: Rigoberto Silva   :  1950   MRN:  [id number]   Attending: Orlando Garcia MD  PCP:  Dallas Neff MD    SUBJECTIVE:   Mr. Allison Nettles is a 69M with pmhx of COPD follows with pulm, DM2, who presents with increased dyspnea,hypoxia and confusion. He is currently being treated for COPD exacerbation secondary to viral URI. He reports that dyspnea is resolved and is saturating well on RA. He is oriented to person, place and time and is communicative. He has a dry cough and is ambulating well.       Review of Systems negative with exception of pertinent positives noted above  PHYSICAL EXAM     Visit Vitals    /64 (BP 1 Location: Right arm, BP Patient Position: At rest)    Pulse 94    Temp 99 °F (37.2 °C)    Resp 18    Wt 130 kg (286 lb 9.6 oz)    SpO2 99%    BMI 34.89 kg/m2      Temp (24hrs), Av.1 °F (36.7 °C), Min:97.4 °F (36.3 °C), Max:99 °F (37.2 °C)    Oxygen Therapy  O2 Sat (%): 99 % (18)  Pulse via Oximetry: 88 beats per minute (18)  O2 Device: Nasal cannula (decreased to RA) (18)  O2 Flow Rate (L/min): 2 l/min (18 08)  FIO2 (%): 32 % (18 1141)    Intake/Output Summary (Last 24 hours) at 18 1045  Last data filed at 03/10/18 1217   Gross per 24 hour   Intake              240 ml   Output                0 ml   Net              240 ml      General: No acute distress    Lungs:  Clear to ausculation (improved from previous wheezing)  Heart:  Regular rate and rhythm,  No murmur, rub, or gallop  Abdomen: Soft, Non distended, Non tender, Positive bowel sounds  Extremities: No cyanosis, clubbing or edema  Neurologic:  No focal deficits, oriented to person, place and time    ASSESSMENT      Active Hospital Problems    Diagnosis Date Noted    COPD with acute exacerbation ([address] 2018    Acute respiratory failure with hypoxia (HCC) 2018    [facility] Q[facility]ADA 2018  Acute encephalopathy 03/06/2018    Controlled type 2 diabetes mellitus with diabetic polyneuropathy, with long-term current use of insulin ([facility] 10/13/2017    Coronary artery disease of native artery of native heart with stable angina pectoris ([facility] 10/13/2017     08/2012:  3.0x13 Cypher mLAD  10/2017:  3.5x32 Synergy pLAD, 3.0x38. 3.5x20 Synergy dRCA      Essential hypertension 10/13/2017     A/P  -Acute COPD exacerbation secondary to viral URI - Continue duonebs Q4H and prednisone day 5/5    - Acute encephalopathy - Resolved,suspect most likely secondary to delirium  - DM/SIHG - uncontrolled. Discharge home on lantus 45 units daily (prednisone will stop after today)  - CAD - stable. trops negative.  ECG first degree AVB    DVT Prophylaxis: lovenox sq  Dispo: Discharge home today    Signed By: Bob Bloom MD     March 11, 2018

## 2018-03-12 NOTE — PROGRESS NOTES
Transition of Care Discharge Follow-up Questionnaire   Date/Time of Call:  3/12/18 9:00 am   What was the patient hospitalized for? Acute Respiratory Failure with hypoxia   Does the patient understand his/her diagnosis and/or treatment and what happened during the hospitalization? Yes patient verbalized understanding of treatments during hospitalization. Did the patient receive discharge instructions? yes   Review any discharge instructions (see notes in [facility]). Ask patient if they understand these. Do they have any questions? Patient verbalized understanding of all discharge orders   Were home services ordered (nursing, PT, OT, ST, etc.)? Patient refused [facility] services   If so, has the first visit occurred? If not, why? (Assist with coordination of services if necessary.) N/A   Was any DME ordered? No   If so, has it been received? If not, why?  (Assist with coordination of arranging DME orders if necessary.) N/A   Complete a review of all medications (new, continued and discontinued meds per the D/C instructions and medication tab in [facility]). Yes    Were all new prescriptions filled? If not, why?  (Assist with obtainment of medications if necessary.) Yes   Does the patient understand the purpose and dosing instructions for all medications? (If patient has questions, provide explanation and education.) Yes  patient verbalized the purpose and dosing instructions of medications. Does the patient have any problems in performing ADLs? (If patient is unable to perform ADLs  what is the limiting factor(s)? Do they have a support system that can assist? If no support system is present, discuss possible assistance that they may be able to obtain.)             Patient independent with ADLs. Patients wife available if needed. Does the patient have all follow-up appointments scheduled? Has transportation been arranged?   [facility] Pulmonary follow-up should be within 7 days of discharge; all others should have PCP follow-up within 7 days of discharge; follow-ups with other specialists as appropriate or ordered.) PCP- 3/15/18 at 11:45  Transportation arranged. Any other questions or concerns expressed by the patient? Patient had no questions or concerns at time of call. Schedule next appointment with [facility] Coordinator or refer to RN Case Manager/  as appropriate. CM will follow up with patient per RAY protocol. Contact information provided. RAY Call Completed By:  Home Visit   Rohit Myers RN       This note will not be viewable in [address]

## 2018-03-14 NOTE — PROGRESS NOTES
Home Visit # 1 initial visit     Safety Concerns: Pt has unsteady gait, which may cause a fall, pt also is not directly involved with medications, his wife handles all of this. DME: Pt has a nebulizer but does not know where he got it from. Pt has flutter valve that he uses with his nebulizer    Follow Up Appointments: PCP Dr. Orlando Edwards 3-15/2018 @ [phone]; Cardiology Dr. Anitra Landau 2018 0492     Transportation: Pts wife transports    Medications: Reviewed pts medication list which needs reconciled. He does not have any nebulized respiratory medications on the list. The pt handed HC a bag of nebulizer meds which included Xopenex that  in , and Pulmicort that is also . When mentioned this to the pt he made a comment to the affect of financially this is how it has to be. Pt states that his wife prepares his nebulizer for him 4x daily, and he does not know what she is putting in it. Pt states that he does use his Symbicort inhaler 2 puffs twice daily, and that is not on his medication list either. Pt states that his wife has it all under control. She was not at the residence at the time of this visit, but will be at the next visit. Education: Pt educated on rescue inhaler vs. Maintenance inhalers vs. Nebulizer medication    Patient/Family Concerns: Patient felt as though his neuropathy was the worst of his medical conditions, unaware of the chronic disease of his lung condition. Tasks: none at this time    Closing Comments:Physical Assessment completed and noted on Pulmonary Assessment Form. COPD Action plan reviewed. Help line discussed. Will follow up with pt in one week via home visit. End of Visit. Melanie Pal, Paramedic, Health .

## 2018-03-19 NOTE — PROGRESS NOTES
RAY follow up call. Patient reports he did attend PCP follow up appointment as scheduled 3/15/18. He continues to be engaged with Health . Next scheduled outreach 3/22/18. Reports no issues/concerns at time of call. This note will not be viewable in [address]

## 2018-03-23 NOTE — PROGRESS NOTES
Home Visit #2  Health  arrived at residence to find patient moving about well. Patient has stopped smoking. He states that he is feeling much better and gaining strength. Patient is talkative but looses focus easily. Freely admits being \"messed up in the head\" from numerous football concussions. Will follow next week. Safety Concerns:None    DME:No Issues    Follow Up Appointments:Sees Dr Blaire Cam \"Soon\" per patient the wife keeps calendar. Wife not present. Will see Dr Jl Gallardo on 4/26 @ [phone] for cardiology follow up. Transportation: Wife    Medications: On hand and patient is compliant. He has not filled Rx for rescue inhaler (Albuterol) to date. Education: None    Patient/Family Concerns: None    Tasks:None    Physical Assessment completed and noted on Pulmonary Assessment Form. COPD Action plan reviewed. Help line discussed. End of Visit.  Conor Bravo, Paramedic, Health

## 2018-04-02 NOTE — PROGRESS NOTES
RAY follow up call. Patient still engaged with Health . Reports doing well. Contact information provided with issues prior to next follow up call. This note will not be viewable in [address]

## 2018-04-03 NOTE — PROGRESS NOTES
Home Visit #3  Health  arrived at residence to find patient moving about the home, alert and oriented with no respiratory complaint. Patient complains of right hip pain that will be address locally by Dr Missael Womack, and at the [facility] in [facility]. Patient also complains of left lower leg edema. Patient stated that it was getting better until he began walking for exercise. Believes the edema is related to sitting up for hours at a time doing computer work. Denies any shortness of breath. Lung sounds are clear. Patient was encouraged to contact Help-line or PCP is edema worsens. Safety Concerns:None    DME:No Issues. Follow Up Appointments:  4/17 Dr Riki Yung 8[address] Surgery  4/26 1300  Kaity [facility] Cardiology    Transportation:Self    Medications: On hand and patient states that he is compliant. Education:COPD Action Plan     Patient/Family Concerns:None    Tasks:None    Physical Assessment completed and noted on Pulmonary Assessment Form. COPD Action plan reviewed. Help line discussed. End of Visit.  Idania Phillips, Paramedic, Health

## 2018-04-10 NOTE — PROGRESS NOTES
Home Visit #3  Health  arrived at residence to find patient at the [facility] Patient states that he feels pretty good, as his right hip has hurt less, and his left leg edema had resolved. Patient stated that his wife had a cold last week and he felt bad one day but got over it. HC will continue to follow with in home visits. Safety Concerns:None    DME:No Issues    Follow Up Appointments:  4/17 Dr Campos Souza   4/26 1500 Dr Marlon Troy Cardiology    Transportation:Self    Medications: On hand and is compliant    Education:COPD Action Plan    Patient/Family Concerns: None    Tasks:None    Physical Assessment completed and noted on Pulmonary Assessment Form. COPD Action plan reviewed. Help line discussed. End of Visit.  Jaswinder Renteria Paramedic, Health

## 2018-04-16 NOTE — PROGRESS NOTES
RAY follow up call. Unable to reach patient. Message left with contact information requesting a return call with issues/concerns. Episode resolved due to patient being engaged with health care . Will open new episode if return call received. This note will not be viewable in [address]

## 2018-04-17 NOTE — PROGRESS NOTES
Home Visit #5  Health  arrived at residence to find patient at dining table awaiting visit. Patient is alert and oriented in no distress. Patient complains that It has been a bad week. Has had lots of right hip pain and left leg Sciatica pain. Decreased activity as result. Stated that he didn't get out of bed for two days. Stated he had a sore throat for one day but was resolved with Vit C and inhaler. No respiratory complaint today. Will follow with in home visit next week. Safety Concerns:None    DME:No Issues    Follow Up Appointments:None this week    Transportation:Self    Medications: On hand and compliant    Education:None, Reinforced COPD action plan    Patient/Family Concerns:None    Tasks:None    Physical Assessment completed and noted on Pulmonary Assessment Form. COPD Action plan reviewed. Help line discussed. End of Visit.  Carolina Maurice, Paramedic, Health

## 2018-04-25 NOTE — PROGRESS NOTES
Home Visit #6  Health  arrived at residence to find patient at the front door. Patient states that he has had an up and down week. Complains of stuffy nose. The patients biggest concern is continued left leg pain and now left lower leg edema. Patient also continues to have right hip pain. Patient is to see cardiology on 4/26 and will address leg edema at that visit. Edema is not pitting anselmo patient denies significant weight gain over the last week. Patients BGL is under control at this time. Will see this patient with in home visit next week. Safety Concerns:None    DME:No Issues    Follow Up Appointments:  4/26 1100 Dr Cristhian Calle MD, [facility] Cardiology    Transportation:Self    Medications: On hand and compliant    Education:Low sodium diet, and COPD action plan    Patient/Family Concerns:None    Tasks:None    Physical Assessment completed and noted on Pulmonary Assessment Form. COPD Action plan reviewed. Help line discussed. End of Visit.  Daniella Ardon, Paramedic, Health

## 2018-05-01 NOTE — PROGRESS NOTES
Home Visit #7  Health  arrived at residence to find bacilio sitting at the dining table. Patient is alert and oriented in no acute respiratory distress. The patient complains of neuropathy pain. BGL's are very brittle and been up and down. Dr Haylee Santos is very concerned about BGL and renal function. Pedal edema remains but is much improved from last visit. Patient complains today of sinus congestion today. HC suggested Zyrtec or Claritin as a antihistamine. Will follow in home next week. Safety Concerns:None    DME:No Issues    Follow Up Appointments:None this week    Transportation:Self    Medications: On hand and compliant. Suggested Zyrtec or Claritin for seasonal allergies. Education:COPD action Plan    Patient/Family Concerns:Pain Management    Tasks:None    Physical Assessment completed and noted on Pulmonary Assessment Form. COPD Action plan reviewed. Help line discussed. End of Visit.  Lanette Delgadillo, Paramedic, Health

## 2018-05-10 NOTE — PROGRESS NOTES
Home Visit #8  Health  arrived at residence to find patient moving about the home in no distress. Patient has had no  Respiratory complaints currently. Discussed trigger avoidance with the current pollen conditions. Patient is using his nebulizer twice daily. Patients edema is decreased with the use of compression hose, and  His hip pain is more diminished currently. BGL's remain steady without peaks and valleys in the upper 100's. Will follow this patient with in home visit next week. Safety Concerns:None    DME:No Issues    Follow Up Appointments: None this week    Transportation:Self    Medications: On hand and compliant. Education:Respiratory trigger avoidance    Patient/Family Concerns: None Will be out of town 3rd week of June    Tasks:None    Physical Assessment completed and noted on Pulmonary Assessment Form. COPD Action plan reviewed. Help line discussed. End of Visit.  Jerald Friend, Paramedic, Health

## 2018-05-15 NOTE — PROGRESS NOTES
Home Visit #8  Health  arrived at residence to find patient arriving at his house from an exercise walk. Patient si in good spirits and without respiratory complaint. Stated that his BGL's were even and his leg edema had resolved. He is trying to loose some weight. He is sleeping well and using his nebulizer routinely. Will continue to follow this patient with an in home visit next week. Safety Concerns:None    DME:No Issues    Follow Up Appointments: Patient lost appointment card. None this week but thinks he has an servando't with Dr Sanjana Fierro next week. Transportation:Self    Medications: On hand and compliant    Education:Trigger avoidance, an d COPD action plan    Patient/Family Concerns:None    Tasks:None    Physical Assessment completed and noted on Pulmonary Assessment Form. COPD Action plan reviewed. Help line discussed. End of Visit.  Winnie Negrete Paramedic, Health

## 2018-05-23 NOTE — PROGRESS NOTES
Home Visit #9- Final Home Visit  Health  arrived at residence to [facility] on the couch. Patient denies any respiratory complaint and states that he feels 'OK' today. Patient complains of pain to hands legs and feet secondary to peripheral neuropathy. Stated that the pain was so severe on night that he vomited. Patient stated that his BGL was 119 this morning. Patient is compliant with meds. Will follow going forward via chart review. Safety Concerns:None    DME:No Issues    Follow Up Appointments:None per patient    Transportation:Wife/Self    Medications: On hand and compliant    Education:COPD action plan reviewed    Patient/Family Concerns:Continued peripheral neuropathy pain    Tasks:None    Physical Assessment completed and noted on Pulmonary Assessment Form. COPD Action plan reviewed. Help line discussed. End of Visit.  Jerald Friend, Paramedic, Health

## 2018-06-08 NOTE — PROGRESS NOTES
Contacted patient on the final day of his program to check on his condition. Left message but patient did not return the call. Will re-attempt next week.